# Patient Record
Sex: FEMALE | Race: OTHER | HISPANIC OR LATINO
[De-identification: names, ages, dates, MRNs, and addresses within clinical notes are randomized per-mention and may not be internally consistent; named-entity substitution may affect disease eponyms.]

---

## 2017-01-09 ENCOUNTER — TRANSCRIPTION ENCOUNTER (OUTPATIENT)
Age: 42
End: 2017-01-09

## 2017-01-13 ENCOUNTER — RECORD ABSTRACTING (OUTPATIENT)
Age: 42
End: 2017-01-13

## 2018-06-05 ENCOUNTER — TRANSCRIPTION ENCOUNTER (OUTPATIENT)
Age: 43
End: 2018-06-05

## 2018-09-29 ENCOUNTER — INPATIENT (INPATIENT)
Facility: HOSPITAL | Age: 43
LOS: 1 days | Discharge: LEFT BEFORE DISCHARGE | End: 2018-10-01
Attending: HOSPITALIST | Admitting: HOSPITALIST

## 2018-09-29 VITALS
HEIGHT: 62 IN | WEIGHT: 141.98 LBS | SYSTOLIC BLOOD PRESSURE: 118 MMHG | OXYGEN SATURATION: 97 % | HEART RATE: 111 BPM | TEMPERATURE: 102 F | RESPIRATION RATE: 18 BRPM | DIASTOLIC BLOOD PRESSURE: 60 MMHG

## 2018-09-29 DIAGNOSIS — Z88.0 ALLERGY STATUS TO PENICILLIN: ICD-10-CM

## 2018-09-29 DIAGNOSIS — R94.5 ABNORMAL RESULTS OF LIVER FUNCTION STUDIES: ICD-10-CM

## 2018-09-29 LAB
ALBUMIN SERPL ELPH-MCNC: 4.3 G/DL — SIGNIFICANT CHANGE UP (ref 3.5–5.2)
ALP SERPL-CCNC: 85 U/L — SIGNIFICANT CHANGE UP (ref 30–115)
ALT FLD-CCNC: 41 U/L — SIGNIFICANT CHANGE UP (ref 0–41)
ANION GAP SERPL CALC-SCNC: 15 MMOL/L — HIGH (ref 7–14)
ANISOCYTOSIS BLD QL: SIGNIFICANT CHANGE UP
APPEARANCE UR: ABNORMAL
AST SERPL-CCNC: 46 U/L — HIGH (ref 0–41)
BACTERIA # UR AUTO: ABNORMAL /HPF
BASE EXCESS BLDV CALC-SCNC: 1.7 MMOL/L — SIGNIFICANT CHANGE UP (ref -2–2)
BASOPHILS # BLD AUTO: 0 K/UL — SIGNIFICANT CHANGE UP (ref 0–0.2)
BASOPHILS NFR BLD AUTO: 0 % — SIGNIFICANT CHANGE UP (ref 0–1)
BILIRUB SERPL-MCNC: 0.4 MG/DL — SIGNIFICANT CHANGE UP (ref 0.2–1.2)
BILIRUB UR-MCNC: NEGATIVE — SIGNIFICANT CHANGE UP
BUN SERPL-MCNC: 8 MG/DL — LOW (ref 10–20)
CA-I SERPL-SCNC: 1.19 MMOL/L — SIGNIFICANT CHANGE UP (ref 1.12–1.3)
CALCIUM SERPL-MCNC: 9.1 MG/DL — SIGNIFICANT CHANGE UP (ref 8.5–10.1)
CHLORIDE SERPL-SCNC: 99 MMOL/L — SIGNIFICANT CHANGE UP (ref 98–110)
CO2 SERPL-SCNC: 23 MMOL/L — SIGNIFICANT CHANGE UP (ref 17–32)
COLOR SPEC: YELLOW — SIGNIFICANT CHANGE UP
CREAT SERPL-MCNC: 0.8 MG/DL — SIGNIFICANT CHANGE UP (ref 0.7–1.5)
DIFF PNL FLD: ABNORMAL
EOSINOPHIL # BLD AUTO: 0.08 K/UL — SIGNIFICANT CHANGE UP (ref 0–0.7)
EOSINOPHIL NFR BLD AUTO: 1.7 % — SIGNIFICANT CHANGE UP (ref 0–8)
GAS PNL BLDV: 137 MMOL/L — SIGNIFICANT CHANGE UP (ref 136–145)
GAS PNL BLDV: SIGNIFICANT CHANGE UP
GIANT PLATELETS BLD QL SMEAR: PRESENT — SIGNIFICANT CHANGE UP
GLUCOSE SERPL-MCNC: 109 MG/DL — HIGH (ref 70–99)
GLUCOSE UR QL: NEGATIVE MG/DL — SIGNIFICANT CHANGE UP
HCO3 BLDV-SCNC: 27 MMOL/L — SIGNIFICANT CHANGE UP (ref 22–29)
HCT VFR BLD CALC: 35.6 % — LOW (ref 37–47)
HCT VFR BLDA CALC: 37.7 % — SIGNIFICANT CHANGE UP (ref 34–44)
HGB BLD CALC-MCNC: 12.3 G/DL — LOW (ref 14–18)
HGB BLD-MCNC: 11.5 G/DL — LOW (ref 12–16)
KETONES UR-MCNC: NEGATIVE — SIGNIFICANT CHANGE UP
LACTATE BLDV-MCNC: 1.8 MMOL/L — HIGH (ref 0.5–1.6)
LACTATE SERPL-SCNC: 1.5 MMOL/L — SIGNIFICANT CHANGE UP (ref 0.5–2.2)
LEUKOCYTE ESTERASE UR-ACNC: NEGATIVE — SIGNIFICANT CHANGE UP
LYMPHOCYTES # BLD AUTO: 1.08 K/UL — LOW (ref 1.2–3.4)
LYMPHOCYTES # BLD AUTO: 22.6 % — SIGNIFICANT CHANGE UP (ref 20.5–51.1)
MANUAL SMEAR VERIFICATION: SIGNIFICANT CHANGE UP
MCHC RBC-ENTMCNC: 24.2 PG — LOW (ref 27–31)
MCHC RBC-ENTMCNC: 32.3 G/DL — SIGNIFICANT CHANGE UP (ref 32–37)
MCV RBC AUTO: 74.9 FL — LOW (ref 81–99)
MICROCYTES BLD QL: SIGNIFICANT CHANGE UP
MONOCYTES # BLD AUTO: 0.33 K/UL — SIGNIFICANT CHANGE UP (ref 0.1–0.6)
MONOCYTES NFR BLD AUTO: 7 % — SIGNIFICANT CHANGE UP (ref 1.7–9.3)
NEUTROPHILS # BLD AUTO: 3.15 K/UL — SIGNIFICANT CHANGE UP (ref 1.4–6.5)
NEUTROPHILS NFR BLD AUTO: 27.8 % — LOW (ref 42.2–75.2)
NEUTS BAND # BLD: 38.3 % — HIGH (ref 0–6)
NITRITE UR-MCNC: NEGATIVE — SIGNIFICANT CHANGE UP
NRBC # BLD: 0 /100 WBCS — SIGNIFICANT CHANGE UP (ref 0–0)
PCO2 BLDV: 45 MMHG — SIGNIFICANT CHANGE UP (ref 41–51)
PH BLDV: 7.39 — SIGNIFICANT CHANGE UP (ref 7.26–7.43)
PH UR: 7.5 — SIGNIFICANT CHANGE UP (ref 5–8)
PLAT MORPH BLD: NORMAL — SIGNIFICANT CHANGE UP
PLATELET # BLD AUTO: 257 K/UL — SIGNIFICANT CHANGE UP (ref 130–400)
PO2 BLDV: 36 MMHG — SIGNIFICANT CHANGE UP (ref 20–40)
POIKILOCYTOSIS BLD QL AUTO: SLIGHT — SIGNIFICANT CHANGE UP
POLYCHROMASIA BLD QL SMEAR: SIGNIFICANT CHANGE UP
POTASSIUM BLDV-SCNC: 3.9 MMOL/L — SIGNIFICANT CHANGE UP (ref 3.3–5.6)
POTASSIUM SERPL-MCNC: 4.4 MMOL/L — SIGNIFICANT CHANGE UP (ref 3.5–5)
POTASSIUM SERPL-SCNC: 4.4 MMOL/L — SIGNIFICANT CHANGE UP (ref 3.5–5)
PROT SERPL-MCNC: 7.2 G/DL — SIGNIFICANT CHANGE UP (ref 6–8)
PROT UR-MCNC: 30 MG/DL
RBC # BLD: 4.75 M/UL — SIGNIFICANT CHANGE UP (ref 4.2–5.4)
RBC # FLD: 20.7 % — HIGH (ref 11.5–14.5)
RBC BLD AUTO: ABNORMAL
RBC CASTS # UR COMP ASSIST: ABNORMAL /HPF
ROULEAUX BLD QL SMEAR: PRESENT — SIGNIFICANT CHANGE UP
SAO2 % BLDV: 64 % — SIGNIFICANT CHANGE UP
SODIUM SERPL-SCNC: 137 MMOL/L — SIGNIFICANT CHANGE UP (ref 135–146)
SP GR SPEC: 1.02 — SIGNIFICANT CHANGE UP (ref 1.01–1.03)
UROBILINOGEN FLD QL: 1 MG/DL (ref 0.2–0.2)
VARIANT LYMPHS # BLD: 2.6 % — SIGNIFICANT CHANGE UP (ref 0–5)
WBC # BLD: 4.77 K/UL — LOW (ref 4.8–10.8)
WBC # FLD AUTO: 4.77 K/UL — LOW (ref 4.8–10.8)

## 2018-09-29 RX ORDER — ACETAMINOPHEN 500 MG
650 TABLET ORAL ONCE
Qty: 0 | Refills: 0 | Status: COMPLETED | OUTPATIENT
Start: 2018-09-29 | End: 2018-09-29

## 2018-09-29 RX ORDER — SODIUM CHLORIDE 9 MG/ML
1000 INJECTION INTRAMUSCULAR; INTRAVENOUS; SUBCUTANEOUS ONCE
Qty: 0 | Refills: 0 | Status: COMPLETED | OUTPATIENT
Start: 2018-09-29 | End: 2018-09-29

## 2018-09-29 RX ADMIN — Medication 650 MILLIGRAM(S): at 21:55

## 2018-09-29 RX ADMIN — SODIUM CHLORIDE 1000 MILLILITER(S): 9 INJECTION INTRAMUSCULAR; INTRAVENOUS; SUBCUTANEOUS at 22:52

## 2018-09-29 RX ADMIN — SODIUM CHLORIDE 2000 MILLILITER(S): 9 INJECTION INTRAMUSCULAR; INTRAVENOUS; SUBCUTANEOUS at 23:39

## 2018-09-29 NOTE — ED PROVIDER NOTE - PROGRESS NOTE DETAILS
neg flu a and b Flu neg. Discussed admission with pt for fever/bandemia, pt agreeable with plan. Spoke with MAR, will admit pt to medicine under hospitalist. Discussed abx with MAR, recommending holding off as no known source at this time.

## 2018-09-29 NOTE — ED ADULT NURSE NOTE - OBJECTIVE STATEMENT
patient complaints of fever, sore throat and back pain.  Patient reports recent diagnosis of strep throat.  Patient denies chest pain.  Patient denies vomiting but reports nausea.  Patient reports generalized weakness and headache.

## 2018-09-29 NOTE — ED ADULT NURSE NOTE - NSIMPLEMENTINTERV_GEN_ALL_ED
Implemented All Universal Safety Interventions:  [place] to call system. Call bell, personal items and telephone within reach. Instruct patient to call for assistance. Room bathroom lighting operational. Non-slip footwear when patient is off stretcher. Physically safe environment: no spills, clutter or unnecessary equipment. Stretcher in lowest position, wheels locked, appropriate side rails in place.

## 2018-09-29 NOTE — ED PROVIDER NOTE - ATTENDING CONTRIBUTION TO CARE
44 yo f with pmh of anemia, fibroids, presents with c/o fever, malaise, sore throat, and mild cough.  had diarrhea today x 1, no vomiting, no abd pain, no urinary sx.  pt admits whole body aches.  no headache, no neck pain or stiffness, no rash.  exam: nad, ncat, perrl, eomi, mmm, erythematous OP, no swelling, no exudates, mildly tachy reg, ctab, abd soft, nt,nd aox3, no meningismus imp: pt with fever and malaise, sore throat, likely viral, will check labs, ua, cxr and labs.  found to have bandemia, will admit to r/o bacteremia

## 2018-09-29 NOTE — ED PROVIDER NOTE - OBJECTIVE STATEMENT
Pt is a 42 y/o Female, PMHX of anemia, uterine fibroids, endometriosis, presents to ED w/ fever x 2 days. Pt did not take temperature at home, waking up in sweats. Pt also endorses she feels dizzy, has sore throat, has n/d as of today, has abdominal pain and pain on urination but also has her menstrual period so is unsure if that is related. Pt reports a few weeks ago, she had sore throat also, went to urgent care where she was diagnosed with strep throat. Was started on steroids (she believes Prednisone) & abx which she does not recall name of. Also reports she started coughing today. No sick contacts. No recent travel. Denies rashes, abrasions, lacerations, ecchymosis, erythema, edema, headache, visual changes, earaches, hearing loss, nasal congestion, chest pain, palpitations, SOB, hemoptysis, vomiting, bloody stools, melena, hematuria, dysuria, myalgias, bony deformity/pain, paresthesias, tingling, weakness.

## 2018-09-29 NOTE — ED PROVIDER NOTE - NS ED ROS FT
Except as documented in HPI, all other ROS negative.   GENERAL: + fever.  SKIN: Denies rashes, abrasions, lacerations, ecchymosis, erythema, or edema.  HEAD: + dizzy.  EYES: Denies blurry vision, diplopia, or photophobia.  ENT: + sore throat.   CARDIAC: Denies chest pain, palpitations, or SOB.   RESPIRATORY: + cough.   GI: Denies abdominal pain, n/v/d.   : Denies hematuria, dysuria or frequency.   MSK: Denies myalgias, bony deformity or pain.   NEURO: Denies paresthesias, tingling, weakness.

## 2018-09-29 NOTE — ED PROVIDER NOTE - PHYSICAL EXAMINATION
VITAL SIGNS: I have reviewed the initial vital signs.   CONSTITUTIONAL: Awake, alert. Well-developed; well-nourished; in no distress. Non-toxic appearing.   SKIN: No rash, vesicles/lesion, abrasions or lacerations. No ecchymosis or signs of trauma. Warm to touch.   HEAD: Normocephalic; atraumatic.   EYES: Symmetrical, no discharge or signs of trauma. Conjunctiva and sclera clear.   ENT: Airway patent. MMM. Oropharynx with erythema. No tonsillar exudates or tonsillar enlargement. Uvula midline.   NECK: Supple; non-tender. No nuchal rigidity or meningismus.   CARD: No chest wall deformity or tenderness. S1, S2 normal; no murmurs, gallops, or rubs. + tachycardia.   RESP: Good air movement. Lungs CTAB. No crackles, wheezes, rales or rhonchi.  ABD: Soft; non-distended; non-tender.   EXT: No bony deformity or tenderness. Normal ROM x 4 extremities.

## 2018-09-30 DIAGNOSIS — Z98.890 OTHER SPECIFIED POSTPROCEDURAL STATES: Chronic | ICD-10-CM

## 2018-09-30 LAB
ALBUMIN SERPL ELPH-MCNC: 4.6 G/DL — SIGNIFICANT CHANGE UP (ref 3.5–5.2)
ALP SERPL-CCNC: 86 U/L — SIGNIFICANT CHANGE UP (ref 30–115)
ALT FLD-CCNC: 57 U/L — HIGH (ref 0–41)
ANION GAP SERPL CALC-SCNC: 15 MMOL/L — HIGH (ref 7–14)
AST SERPL-CCNC: 64 U/L — HIGH (ref 0–41)
BASOPHILS # BLD AUTO: 0.01 K/UL — SIGNIFICANT CHANGE UP (ref 0–0.2)
BASOPHILS NFR BLD AUTO: 0.2 % — SIGNIFICANT CHANGE UP (ref 0–1)
BILIRUB DIRECT SERPL-MCNC: <0.2 MG/DL — SIGNIFICANT CHANGE UP (ref 0–0.2)
BILIRUB INDIRECT FLD-MCNC: >0.3 MG/DL — SIGNIFICANT CHANGE UP (ref 0.2–1.2)
BILIRUB SERPL-MCNC: 0.5 MG/DL — SIGNIFICANT CHANGE UP (ref 0.2–1.2)
BUN SERPL-MCNC: 6 MG/DL — LOW (ref 10–20)
CALCIUM SERPL-MCNC: 9.2 MG/DL — SIGNIFICANT CHANGE UP (ref 8.5–10.1)
CHLORIDE SERPL-SCNC: 101 MMOL/L — SIGNIFICANT CHANGE UP (ref 98–110)
CO2 SERPL-SCNC: 24 MMOL/L — SIGNIFICANT CHANGE UP (ref 17–32)
CREAT SERPL-MCNC: 0.6 MG/DL — LOW (ref 0.7–1.5)
DIR ANTIGLOB POLYSPECIFIC INTERPRETATION: SIGNIFICANT CHANGE UP
EOSINOPHIL # BLD AUTO: 0 K/UL — SIGNIFICANT CHANGE UP (ref 0–0.7)
EOSINOPHIL NFR BLD AUTO: 0 % — SIGNIFICANT CHANGE UP (ref 0–8)
ERYTHROCYTE [SEDIMENTATION RATE] IN BLOOD: 33 MM/HR — HIGH (ref 0–15)
FLU A RESULT: NEGATIVE — SIGNIFICANT CHANGE UP
FLU A RESULT: NEGATIVE — SIGNIFICANT CHANGE UP
FLUAV AG NPH QL: NEGATIVE — SIGNIFICANT CHANGE UP
FLUBV AG NPH QL: NEGATIVE — SIGNIFICANT CHANGE UP
GLUCOSE SERPL-MCNC: 90 MG/DL — SIGNIFICANT CHANGE UP (ref 70–99)
HCT VFR BLD CALC: 38 % — SIGNIFICANT CHANGE UP (ref 37–47)
HGB BLD-MCNC: 11.9 G/DL — LOW (ref 12–16)
IMM GRANULOCYTES NFR BLD AUTO: 0.2 % — SIGNIFICANT CHANGE UP (ref 0.1–0.3)
IRON SATN MFR SERPL: 13 UG/DL — LOW (ref 35–150)
IRON SATN MFR SERPL: 3 % — LOW (ref 15–50)
LDH SERPL L TO P-CCNC: 339 — HIGH (ref 50–242)
LYMPHOCYTES # BLD AUTO: 1.56 K/UL — SIGNIFICANT CHANGE UP (ref 1.2–3.4)
LYMPHOCYTES # BLD AUTO: 33.7 % — SIGNIFICANT CHANGE UP (ref 20.5–51.1)
MAGNESIUM SERPL-MCNC: 2.2 MG/DL — SIGNIFICANT CHANGE UP (ref 1.8–2.4)
MCHC RBC-ENTMCNC: 23.8 PG — LOW (ref 27–31)
MCHC RBC-ENTMCNC: 31.3 G/DL — LOW (ref 32–37)
MCV RBC AUTO: 76 FL — LOW (ref 81–99)
MONOCYTES # BLD AUTO: 0.23 K/UL — SIGNIFICANT CHANGE UP (ref 0.1–0.6)
MONOCYTES NFR BLD AUTO: 5 % — SIGNIFICANT CHANGE UP (ref 1.7–9.3)
NEUTROPHILS # BLD AUTO: 2.82 K/UL — SIGNIFICANT CHANGE UP (ref 1.4–6.5)
NEUTROPHILS NFR BLD AUTO: 60.9 % — SIGNIFICANT CHANGE UP (ref 42.2–75.2)
PHOSPHATE SERPL-MCNC: 3 MG/DL — SIGNIFICANT CHANGE UP (ref 2.1–4.9)
PLATELET # BLD AUTO: 245 K/UL — SIGNIFICANT CHANGE UP (ref 130–400)
POTASSIUM SERPL-MCNC: 4.1 MMOL/L — SIGNIFICANT CHANGE UP (ref 3.5–5)
POTASSIUM SERPL-SCNC: 4.1 MMOL/L — SIGNIFICANT CHANGE UP (ref 3.5–5)
PROT SERPL-MCNC: 7.5 G/DL — SIGNIFICANT CHANGE UP (ref 6–8)
RBC # BLD: 5 M/UL — SIGNIFICANT CHANGE UP (ref 4.2–5.4)
RBC # BLD: 5 M/UL — SIGNIFICANT CHANGE UP (ref 4.2–5.4)
RBC # FLD: 21.4 % — HIGH (ref 11.5–14.5)
RETICS #: 51 K/UL — SIGNIFICANT CHANGE UP (ref 25–125)
RETICS/RBC NFR: 1 % — SIGNIFICANT CHANGE UP (ref 0.5–1.5)
SODIUM SERPL-SCNC: 140 MMOL/L — SIGNIFICANT CHANGE UP (ref 135–146)
TIBC SERPL-MCNC: 401 UG/DL — SIGNIFICANT CHANGE UP (ref 220–430)
TRANSFERRIN SERPL-MCNC: 324 MG/DL — SIGNIFICANT CHANGE UP (ref 200–360)
UIBC SERPL-MCNC: 388 UG/DL — HIGH (ref 110–370)
WBC # BLD: 4.63 K/UL — LOW (ref 4.8–10.8)
WBC # FLD AUTO: 4.63 K/UL — LOW (ref 4.8–10.8)

## 2018-09-30 RX ORDER — IBUPROFEN 200 MG
400 TABLET ORAL EVERY 6 HOURS
Qty: 0 | Refills: 0 | Status: DISCONTINUED | OUTPATIENT
Start: 2018-09-30 | End: 2018-10-01

## 2018-09-30 RX ORDER — ENOXAPARIN SODIUM 100 MG/ML
40 INJECTION SUBCUTANEOUS EVERY 24 HOURS
Qty: 0 | Refills: 0 | Status: DISCONTINUED | OUTPATIENT
Start: 2018-09-30 | End: 2018-10-01

## 2018-09-30 RX ORDER — KETOROLAC TROMETHAMINE 30 MG/ML
15 SYRINGE (ML) INJECTION ONCE
Qty: 0 | Refills: 0 | Status: DISCONTINUED | OUTPATIENT
Start: 2018-09-30 | End: 2018-09-30

## 2018-09-30 RX ORDER — SODIUM CHLORIDE 9 MG/ML
1000 INJECTION, SOLUTION INTRAVENOUS
Qty: 0 | Refills: 0 | Status: DISCONTINUED | OUTPATIENT
Start: 2018-09-30 | End: 2018-10-01

## 2018-09-30 RX ORDER — CHLORHEXIDINE GLUCONATE 213 G/1000ML
1 SOLUTION TOPICAL
Qty: 0 | Refills: 0 | Status: DISCONTINUED | OUTPATIENT
Start: 2018-09-30 | End: 2018-10-01

## 2018-09-30 RX ADMIN — Medication 400 MILLIGRAM(S): at 06:56

## 2018-09-30 RX ADMIN — ENOXAPARIN SODIUM 40 MILLIGRAM(S): 100 INJECTION SUBCUTANEOUS at 21:15

## 2018-09-30 RX ADMIN — SODIUM CHLORIDE 100 MILLILITER(S): 9 INJECTION, SOLUTION INTRAVENOUS at 21:16

## 2018-09-30 RX ADMIN — SODIUM CHLORIDE 100 MILLILITER(S): 9 INJECTION, SOLUTION INTRAVENOUS at 05:21

## 2018-09-30 RX ADMIN — Medication 650 MILLIGRAM(S): at 08:27

## 2018-09-30 RX ADMIN — Medication 400 MILLIGRAM(S): at 15:34

## 2018-09-30 RX ADMIN — Medication 15 MILLIGRAM(S): at 08:27

## 2018-09-30 RX ADMIN — Medication 15 MILLIGRAM(S): at 00:23

## 2018-09-30 RX ADMIN — SODIUM CHLORIDE 100 MILLILITER(S): 9 INJECTION, SOLUTION INTRAVENOUS at 15:34

## 2018-09-30 RX ADMIN — Medication 400 MILLIGRAM(S): at 08:27

## 2018-09-30 NOTE — H&P ADULT - NSHPREVIEWOFSYSTEMS_GEN_ALL_CORE
REVIEW OF SYSTEMS:    CONSTITUTIONAL: see hpi  EYES/ENT: No visual changes  NECK: neck pain (chronic)  RESPIRATORY: intermittent cough  CARDIOVASCULAR: No chest pain or palpitations, no lower extremity edema  GASTROINTESTINAL: 1 episode diarrhea  GENITOURINARY: No pain with urination, no increased urinary frequency, no dark o r bloody urine  NEUROLOGICAL: No numbness or weakness  SKIN: No itching, rashes

## 2018-09-30 NOTE — H&P ADULT - HISTORY OF PRESENT ILLNESS
43/F hx of anemia, unterine fibroids s/p embolization, presents with subjective fever.    Pt states Wednesday she was not feeling well. Then Thursday and friday she was having fevers w/ chills & sweating. See did not check her temperature. She took tylenol, which helped with fever, but fever would come back again. She had 1 episode of diarrhea today - she didnt look at her stool though, but it was watery. No urinary symptoms. No SOB. No rash. No swollen joints. She does report muscle aches. No chest pain. Has intermittent cough, no coughing now. Has left trapezius pain and left sided neck pain - which is chronic due to herniated disc.    Recently treated for strep throat (diagnosed on examination only, never was swabbed) - completed 10 days antibiotics 1 wk ago. No longer pain with swallowing.

## 2018-09-30 NOTE — H&P ADULT - ASSESSMENT
DVT ppx  CHG bath daily & PRN  Regular diet # Subjective fevers  - No tachy, no hypotension, no lactic acidosis  - No CXR findings or findings on physical exam s/o pulmonary infection  - Abdomen is non-tender   - F/u bld cx  - Repeat CBC (had 40% bands)  - Check TSH    # Anemia  - pt on menstrual cycle now  - check Fe studies    DVT ppx  CHG bath daily & PRN  Regular diet 43/F hx of anemia, unterine fibroids s/p embolization, presents with subjective fever, 1 episode diarrhea, recent tx strep pharyngitis. While in ED pt had fever 101.8, bandemia (40%).     # Fever - no source  - No tachy, no hypotension, no lactic acidosis  - No CXR findings or findings on physical exam s/o pulmonary infection  - Abdomen is non-tender   - F/u bld cx  - Repeat CBC (had 40% bands)  - Check TSH    # Anemia  - pt on menstrual cycle now  - check Fe studies    DVT ppx  CHG bath daily & PRN  Regular diet 43/F hx of anemia, unterine fibroids s/p embolization, presents with subjective fever, 1 episode diarrhea, recent tx strep pharyngitis. While in ED pt had fever 101.8, bandemia (40%), and leukopenia.     # Fever with bandemia & leukopenia - likely viral etiology  - No tachy, no hypotension, no lactic acidosis  - No CXR findings or findings on physical exam s/o pulmonary infection  - Swabbed by ED - neg for flu  - Abdomen is non-tender   - F/u bld cx  - Repeat CBC (had 40% bands)  - Check TSH  - Check HIV - pt states never had sexual relations    # Anemia  - pt on menstrual cycle now  - check Fe studies  - Check hemolytic w/u  DVT ppx  CHG bath daily & PRN  Regular diet 43/F hx of anemia, unterine fibroids s/p embolization, presents with subjective fever, 1 episode diarrhea, recent tx strep pharyngitis. While in ED pt had fever 101.8, bandemia (40%), and leukopenia.     # Fever with bandemia & leukopenia - likely viral etiology  - No tachy, no hypotension, no lactic acidosis, no wt loss, no night sweats, no sick contacts  - No CXR findings or findings on physical exam s/o pulmonary infection  - Swabbed by ED - neg for flu  - UA neg, has blood - pt is on menstrual cycle, no leuks or nitrates, no urinary symptoms  - Abdomen is non-tender   - Rouleaux formaiton on CBC - likely related to infection, will check ESR & CRP, and Ur preg. no renal dysfucntion, hypercalcemia, no neurologic symptoms  - No pharyngeal lesions or erythema  - F/u bld cx  - Repeat CBC (had 40% bands)  - Check TSH  - Check HIV - pt states never had sexual relations  - IV hydration  - Motrin PRN for fever    # Anemia  - pt on menstrual cycle now  - check Fe studies, b12, folate  - Check hemolytic w/u    DVT ppx  CHG bath daily & PRN  Regular diet  Ambulate as tolerated  Full Code 43/F hx of anemia, unterine fibroids s/p embolization, presents with subjective fever, 1 episode diarrhea, recent tx strep pharyngitis. While in ED pt had fever 101.8, bandemia (40%), and leukopenia.     # Fever with bandemia & leukopenia - likely viral etiology  - No tachy, no hypotension, no lactic acidosis, no wt loss, no night sweats, no sick contacts  - No CXR findings or findings on physical exam s/o pulmonary infection  - Swabbed by ED - neg for flu  - UA neg, has blood - pt is on menstrual cycle, no leuks or nitrates, no urinary symptoms  - Abdomen is non-tender   - Rouleaux formaiton on CBC - likely related to infection, will check ESR & CRP, and Ur preg. no renal dysfucntion, hypercalcemia, no neurologic symptoms  - No pharyngeal lesions or erythema  - F/u bld cx  - Repeat CBC (had 40% bands)  - Check TSH  - Check HIV - pt states never had sexual relations  - IV hydration  - Motrin PRN for fever    # Anemia - microcytic with increased RDW - likely Iron def. anemia  - pt on menstrual cycle now  - check Fe studies, b12, folate  - Check hemolytic w/u    DVT ppx  CHG bath daily & PRN  Regular diet  Ambulate as tolerated  Full Code 43/F hx of anemia, unterine fibroids s/p embolization, presents with subjective fever, 1 episode diarrhea, recent tx strep pharyngitis. While in ED pt had fever 101.8, bandemia (40%), and leukopenia.     # Fever with bandemia & leukopenia - likely viral etiology  - No tachy, no hypotension, no lactic acidosis  - No CXR findings or findings on physical exam s/o pulmonary infection  - Swabbed by ED - neg for flu  - UA neg, has blood - pt is on menstrual cycle, no leuks or nitrates, no urinary symptoms  - Abdomen is non-tender   - Rouleaux formaiton on CBC - likely related to infection, will check ESR & CRP, and Ur preg. no renal dysfucntion, hypercalcemia, no neurologic symptoms  - No pharyngeal lesions or erythema  - F/u bld cx  - Repeat CBC (had 40% bands)  - Check TSH  - Check HIV - pt states never had sexual relations  - IV hydration  - Motrin PRN for fever    # Anemia - microcytic with increased RDW - likely Iron def. anemia  - pt on menstrual cycle now  - check Fe studies, b12, folate  - Check hemolytic w/u    DVT ppx  CHG bath daily & PRN  Regular diet  Ambulate as tolerated  Full Code 43/F hx of anemia, unterine fibroids s/p embolization, presents with subjective fever, 1 episode diarrhea, recent tx strep pharyngitis (abx unknown name, and steroids). While in ED pt had fever 101.8, bandemia (40%), and leukopenia.     # Fever with bandemia & leukopenia - likely viral etiology  - No tachy, no hypotension, no lactic acidosis  - No CXR findings or findings on physical exam s/o pulmonary infection  - Swabbed by ED - neg for flu  - UA neg, has blood - pt is on menstrual cycle, no leuks or nitrates, no urinary symptoms  - Abdomen is non-tender   - Rouleaux formaiton on CBC - likely related to infection, will check ESR & CRP, and Ur preg. no renal dysfucntion, hypercalcemia, no neurologic symptoms  - No pharyngeal lesions or erythema  - F/u bld cx  - Repeat CBC (had 40% bands)  - Check TSH  - Check HIV - pt states never had sexual relations  - IV hydration  - Motrin PRN for fever    # Anemia - microcytic with increased RDW - likely Iron def. anemia  - pt on menstrual cycle now  - check Fe studies, b12, folate  - Check hemolytic w/u    DVT ppx  CHG bath daily & PRN  Regular diet  Ambulate as tolerated  Full Code 43/F hx of anemia, unterine fibroids s/p embolization, presents with subjective fever, 1 episode diarrhea, recent tx strep pharyngitis (abx unknown name, and steroids). While in ED pt had fever 101.8, bandemia (40%), and leukopenia.     # Fever with bandemia & leukopenia - likely viral etiology  - No tachy, no hypotension, no lactic acidosis  - No CXR findings or findings on physical exam s/o pulmonary infection  - Swabbed by ED - neg for flu  - UA neg, has blood - pt is on menstrual cycle, no leuks or nitrates, no urinary symptoms  - Abdomen is non-tender   - Rouleaux formaiton on CBC - likely related to infection, will check ESR & CRP, and Ur preg. no renal dysfucntion, hypercalcemia, no neurologic symptoms  - No pharyngeal lesions or erythema  - F/u bld cx  - Repeat CBC (had 40% bands)  - Check TSH  - Check HIV - pt states never had sexual relations  - IV hydration  - Motrin PRN for fever    # Anemia - microcytic with increased RDW - likely Iron def. anemia  - pt on menstrual cycle now  - check Fe studies, b12, folate  - Check retic count  - Check hemolytic w/u    DVT ppx  CHG bath daily & PRN  Regular diet  Ambulate as tolerated  Full Code

## 2018-09-30 NOTE — H&P ADULT - FAMILY HISTORY
Family history of diabetes mellitus (DM), mom     Father  Still living? No  Family history of diabetes mellitus, Age at diagnosis: Age Unknown  Family history of bladder cancer, Age at diagnosis: Age Unknown     Sibling  Still living? Yes, Estimated age: 31-40  Family history of diabetes mellitus, Age at diagnosis: Age Unknown

## 2018-09-30 NOTE — H&P ADULT - NSHPPHYSICALEXAM_GEN_ALL_CORE
Vital Signs Last 24 Hrs  T(C): 37.1 (30 Sep 2018 00:46), Max: 38.8 (29 Sep 2018 21:47)  T(F): 98.7 (30 Sep 2018 00:46), Max: 101.8 (29 Sep 2018 21:47)  HR: 83 (30 Sep 2018 00:46) (77 - 111)  BP: 93/50 (30 Sep 2018 00:46) (93/50 - 118/60)  RR: 18 (30 Sep 2018 00:46) (18 - 18)  SpO2: 98% (30 Sep 2018 00:46) (97% - 98%)    PHYSICAL EXAM:  GENERAL: NAD, speaks in full sentences, no signs of respiratory distress  HEAD:  Atraumatic, Normocephalic  EYES: EOMI, conjunctiva and sclera clear, no nystagus  Throat - no erythema, no vescicles, no lesions  NECK: Supple, No JVD, ability to flex neck   CHEST/LUNG: Clear to auscultation bilaterally; No wheeze; No crackles; No accessory muscles used  HEART: Regular rate and rhythm; No murmurs;   ABDOMEN: Soft, Nontender, Nondistended; Bowel sounds present; No guarding; non-rigid  EXTREMITIES:  no Edema  PSYCH: AAOx3  NEUROLOGY: non-focal  SKIN: No rashes or lesions

## 2018-09-30 NOTE — H&P ADULT - ATTENDING COMMENTS
Fever on and off, Leukopenia with bandemia.   Follow up repeat cbc.   Not feeling well yet.   Encourage ample amount of drinking water and or fluid.     D/C planning tomorrow once the patient clinically stable and CBC within expectable range.

## 2018-09-30 NOTE — H&P ADULT - NSHPLABSRESULTS_GEN_ALL_CORE
11.5   4.77  )-----------( 257      ( 29 Sep 2018 22:30 )             35.6           137  |  99  |  8<L>  ----------------------------<  109<H>  4.4   |  23  |  0.8    Ca    9.1      29 Sep 2018 22:30    TPro  7.2  /  Alb  4.3  /  TBili  0.4  /  DBili  x   /  AST  46<H>  /  ALT  41  /  AlkPhos  85          Urinalysis Basic - ( 29 Sep 2018 22:30 )    Color: Yellow / Appearance: Cloudy / S.025 / pH: x  Gluc: x / Ketone: Negative  / Bili: Negative / Urobili: 1.0 mg/dL   Blood: x / Protein: 30 mg/dL / Nitrite: Negative   Leuk Esterase: Negative / RBC: 10-25 /HPF / WBC x   Sq Epi: x / Non Sq Epi: x / Bacteria: Few /HPF    Lactate Trend   @ 22:30 Lactate:1.5     CAPILLARY BLOOD GLUCOSE

## 2018-10-01 VITALS
TEMPERATURE: 99 F | SYSTOLIC BLOOD PRESSURE: 123 MMHG | DIASTOLIC BLOOD PRESSURE: 60 MMHG | RESPIRATION RATE: 18 BRPM | OXYGEN SATURATION: 100 % | HEART RATE: 98 BPM

## 2018-10-01 LAB
CULTURE RESULTS: SIGNIFICANT CHANGE UP
FERRITIN SERPL-MCNC: 76 NG/ML — SIGNIFICANT CHANGE UP (ref 15–150)
FOLATE SERPL-MCNC: 19.6 NG/ML — SIGNIFICANT CHANGE UP
HIV 1+2 AB+HIV1 P24 AG SERPL QL IA: SIGNIFICANT CHANGE UP
SPECIMEN SOURCE: SIGNIFICANT CHANGE UP
TSH SERPL-MCNC: 1.09 UIU/ML — SIGNIFICANT CHANGE UP (ref 0.27–4.2)
VIT B12 SERPL-MCNC: 671 PG/ML — SIGNIFICANT CHANGE UP (ref 232–1245)

## 2018-10-01 RX ORDER — FERROUS SULFATE 325(65) MG
325 TABLET ORAL
Qty: 0 | Refills: 0 | Status: DISCONTINUED | OUTPATIENT
Start: 2018-10-01 | End: 2018-10-01

## 2018-10-01 RX ORDER — TRAMADOL HYDROCHLORIDE 50 MG/1
25 TABLET ORAL ONCE
Qty: 0 | Refills: 0 | Status: DISCONTINUED | OUTPATIENT
Start: 2018-10-01 | End: 2018-10-01

## 2018-10-01 RX ORDER — ACETAMINOPHEN 500 MG
650 TABLET ORAL EVERY 6 HOURS
Qty: 0 | Refills: 0 | Status: DISCONTINUED | OUTPATIENT
Start: 2018-10-01 | End: 2018-10-01

## 2018-10-01 RX ADMIN — Medication 650 MILLIGRAM(S): at 10:26

## 2018-10-01 RX ADMIN — Medication 400 MILLIGRAM(S): at 08:28

## 2018-10-01 RX ADMIN — TRAMADOL HYDROCHLORIDE 25 MILLIGRAM(S): 50 TABLET ORAL at 05:26

## 2018-10-01 RX ADMIN — Medication 400 MILLIGRAM(S): at 08:10

## 2018-10-01 RX ADMIN — Medication 325 MILLIGRAM(S): at 11:16

## 2018-10-01 RX ADMIN — SODIUM CHLORIDE 100 MILLILITER(S): 9 INJECTION, SOLUTION INTRAVENOUS at 15:13

## 2018-10-01 RX ADMIN — Medication 400 MILLIGRAM(S): at 01:15

## 2018-10-01 RX ADMIN — Medication 650 MILLIGRAM(S): at 09:04

## 2018-10-01 RX ADMIN — SODIUM CHLORIDE 100 MILLILITER(S): 9 INJECTION, SOLUTION INTRAVENOUS at 05:12

## 2018-10-01 NOTE — PROGRESS NOTE ADULT - ASSESSMENT
43/F hx of anemia, unterine fibroids s/p embolization, presents with subjective fever, 1 episode diarrhea, recent tx strep pharyngitis (abx unknown name, and steroids). While in ED pt had fever 101.8, bandemia (40%), and leukopenia.     # Fever with bandemia & leukopenia - likely viral etiology  - No tachy, no hypotension, no lactic acidosis  - No CXR findings or findings on physical exam s/o pulmonary infection  - Swabbed by ED - neg for flu  - UA neg, has blood - pt is on menstrual cycle, no leuks or nitrates, no urinary symptoms  - Abdomen is non-tender   - Rouleaux formaiton on CBC - likely related to infection, will check ESR & CRP, and Ur preg. no renal dysfucntion, hypercalcemia, no neurologic symptoms  - No pharyngeal lesions or erythema  - F/u final bld cx, prelim negative  -IV fluids for now  - f/u with ID for evaluation of FUO. f/u FUO workup  -   - IV hydration  - Motrin PRN for fever    # Anemia - microcytic with increased RDW - likely Iron def. anemia  - pt on menstrual cycle now  - check Fe studies, b12, folate  - Check retic count  - Check hemolytic w/u    DVT ppx  CHG bath daily & PRN  Regular diet  Ambulate as tolerated  Full Code

## 2018-10-01 NOTE — PROGRESS NOTE ADULT - SUBJECTIVE AND OBJECTIVE BOX
HOSPITALIST ATTENDING NOTE    CAROLYNN TREVIÑO  43y Female  [id number]    INTERVAL HPI/OVERNIGHT EVENTS: feels ok - +persistent fever  no diarrhea, no abd pain, no uri sx, no meningeal sxs, no cough, no diarrhea, abd pain resolved, no dysuria    T(C): 37.1 (10-01-18 @ 10:26), Max: 38.7 (10-01-18 @ 08:58)  HR: 99 (10-01-18 @ 07:45) (75 - 112)  BP: 109/58 (10-01-18 @ 07:45) (89/57 - 128/69)  RR: 18 (10-01-18 @ 07:45) (18 - 19)  SpO2: 99% (10-01-18 @ 07:45) (99% - 100%)  Wt(kg): --      PHYSICAL EXAM:  GENERAL: NAD  HEAD:  Atraumatic, Normocephalic  EYES: EOMI, PERRLA, conjunctiva and sclera clear  NECK: Supple, No JVD, Normal thyroid  NERVOUS SYSTEM:  Alert & Oriented X3, Good concentration; Non-focal- Motor Strength 5/5 B/L upper and lower extremities;  CHEST/LUNG: Clear to ascultation bilaterally; No rales, rhonchi, wheezing,   HEART: Regular rate and rhythm; No murmurs, rubs, or gallops  ABDOMEN: Soft, Nontender, Nondistended; Bowel sounds present  EXTREMITIES: No clubbing, cyanosis, or edema  SKIN: No rashes or lesions      Consultant(s) Notes Reviewed:  [x ] YES  [ ] NO  Care Discussed with Consultants/Other Providers/ Housestaff [ x] YES  [ ] NO    LABS:                        11.9   4.63  )-----------( 245      ( 30 Sep 2018 13:46 )             38.0     09-30    140  |  101  |  6<L>  ----------------------------<  90  4.1   |  24  |  0.6<L>    Ca    9.2      30 Sep 2018 13:46  Phos  3.0     09-30  Mg     2.2     09-30    TPro  7.5  /  Alb  4.6  /  TBili  0.5  /  DBili  <0.2  /  AST  64<H>  /  ALT  57<H>  /  AlkPhos  86  09-30      Culture - Blood (collected 29 Sep 2018 23:45)  Source: .Blood Blood  Preliminary Report (01 Oct 2018 07:01):    No growth to date.    Culture - Blood (collected 29 Sep 2018 22:40)  Source: .Blood Blood  Preliminary Report (01 Oct 2018 07:01):    No growth to date.    Culture - Urine (collected 29 Sep 2018 22:30)  Source: .Urine Clean Catch (Midstream)  Final Report (01 Oct 2018 11:16):    Culture grew 3 or more types of organisms which indicate    collection contamination; consider recollection only if clinically    indicated.          RADIOLOGY & ADDITIONAL TESTS:    Imaging or report Personally Reviewed:  [ ] YES  [ ] NO    Case discussed with resident    Care discussed with pt/family      HEALTH ISSUES - PROBLEM Dx:

## 2018-10-01 NOTE — DISCHARGE NOTE ADULT - HOSPITAL COURSE
43/F hx of anemia, unterine fibroids s/p embolization, presents with subjective fever.Recently treated for strep throat (diagnosed on examination only, never was swabbed) - completed 10 days antibiotics 1 wk ago. Pt admitted for Fever with bandemia.  Called by RN, as patient wishes to leave AMA. Patient seen at bedside to further discuss plan of care.  Pt is alert and oriented x 3 and competent to make medical decisions.  Discussed risks of leaving against medical advice, which includes worsening of current medical condition, up to and including death.  Patient understands risks and still wishes to leave.  AMA paperwork signed and placed in chart.  Dr. Barrett made aware. 43/F hx of anemia, unterine fibroids s/p embolization, presents with subjective fever.Recently treated for strep throat (diagnosed on examination only, never was swabbed) - completed 10 days antibiotics 1 wk ago. Pt admitted for Fever with bandemia.  Called by RN, as patient wishes to leave AMA. Patient seen at bedside to further discuss plan of care.  Pt is alert and oriented x 3 and competent to make medical decisions.  Discussed risks of leaving against medical advice, which includes worsening of current medical condition, up to and including death.  Patient understands risks and still wishes to leave.  AMA paperwork signed and placed in chart.   Will discuss with attending on call.

## 2018-10-01 NOTE — CONSULT NOTE ADULT - ASSESSMENT
IMPRESSION:  On admission fevers, nl WBC with significant bandemia which is suggestive of a bacterial process.  Blood cultures are negative off antibiotics.  Possible had viral tonsillitis which was thought to be Strep throat 2 weeks ago ( pt never had a throat culture ) which now has caused viremia with mild transaminitis.  See no bacterial infection.    RECOMMENDATIONS:  No antibiotics.  EBV/ CMV serology.  monospot test.  ECHO

## 2018-10-01 NOTE — PROGRESS NOTE ADULT - SUBJECTIVE AND OBJECTIVE BOX
Patient is a 43y old  Female who presents with a chief complaint of subjective fever, bandemia (01 Oct 2018 12:12)        Over Night Events: Patient still has fever regularly despite giving motrin. Will alternate motrin and tylenol. She denies cough, dysuria, chills, diarrhea.        ROS:  See HPI    PHYSICAL EXAM    ICU Vital Signs Last 24 Hrs  T(C): 37.1 (01 Oct 2018 10:26), Max: 38.7 (01 Oct 2018 08:58)  T(F): 98.8 (01 Oct 2018 10:26), Max: 101.7 (01 Oct 2018 08:58)  HR: 99 (01 Oct 2018 07:45) (75 - 112)  BP: 109/58 (01 Oct 2018 07:45) (89/57 - 128/69)  BP(mean): --  ABP: --  ABP(mean): --  RR: 18 (01 Oct 2018 07:45) (18 - 19)  SpO2: 99% (01 Oct 2018 07:45) (99% - 100%)      General: NAD  lying comfortably in bd  HEENT: MARYANN             Lymphatic system: No cervical LN   Lungs: Bilateral BS  Cardiovascular: Regular s1s2 no murmur  Gastrointestinal: Soft, Positive BS  Extremities: No clubbing.  Moves extremities.  Full Range of motion   Skin: Warm, intact  Neurological: AAO4 No motor or sensory deficit         LABS:                            11.9   4.63  )-----------( 245      ( 30 Sep 2018 13:46 )             38.0                                               09-30    140  |  101  |  6<L>  ----------------------------<  90  4.1   |  24  |  0.6<L>    Ca    9.2      30 Sep 2018 13:46  Phos  3.0     09-30  Mg     2.2     -30    TPro  7.5  /  Alb  4.6  /  TBili  0.5  /  DBili  <0.2  /  AST  64<H>  /  ALT  57<H>  /  AlkPhos  86  -30                                             Urinalysis Basic - ( 29 Sep 2018 22:30 )    Color: Yellow / Appearance: Cloudy / S.025 / pH: x  Gluc: x / Ketone: Negative  / Bili: Negative / Urobili: 1.0 mg/dL   Blood: x / Protein: 30 mg/dL / Nitrite: Negative   Leuk Esterase: Negative / RBC: 10-25 /HPF / WBC x   Sq Epi: x / Non Sq Epi: x / Bacteria: Few /HPF                                                  LIVER FUNCTIONS - ( 30 Sep 2018 13:46 )  Alb: 4.6 g/dL / Pro: 7.5 g/dL / ALK PHOS: 86 U/L / ALT: 57 U/L / AST: 64 U/L / GGT: x                                                  Culture - Blood (collected 29 Sep 2018 23:45)  Source: .Blood Blood  Preliminary Report (01 Oct 2018 07:01):    No growth to date.    Culture - Blood (collected 29 Sep 2018 22:40)  Source: .Blood Blood  Preliminary Report (01 Oct 2018 07:01):    No growth to date.    Culture - Urine (collected 29 Sep 2018 22:30)  Source: .Urine Clean Catch (Midstream)  Final Report (01 Oct 2018 11:16):    Culture grew 3 or more types of organisms which indicate    collection contamination; consider recollection only if clinically    indicated.                                                                                           MEDICATIONS  (STANDING):  chlorhexidine 4% Liquid 1 Application(s) Topical <User Schedule>  enoxaparin Injectable 40 milliGRAM(s) SubCutaneous every 24 hours  ferrous    sulfate 325 milliGRAM(s) Oral two times a day  lactated ringers. 1000 milliLiter(s) (100 mL/Hr) IV Continuous <Continuous>    MEDICATIONS  (PRN):  acetaminophen   Tablet .. 650 milliGRAM(s) Oral every 6 hours PRN Temp greater or equal to 38C (100.4F)  ibuprofen  Tablet. 400 milliGRAM(s) Oral every 6 hours PRN Temp greater or equal to 38C (100.4F)      Xrays:                                                                                     ECHO

## 2018-10-01 NOTE — PROGRESS NOTE ADULT - ASSESSMENT
43/F hx of anemia, unterine fibroids s/p embolization, presents with subjective fever.Recently treated for strep throat (diagnosed on examination only, never was swabbed) - completed 10 days antibiotics 1 wk ago. No longer pain with swallowing    .# Fever with bandemia & leukopenia - likely viral etiology    - No tachy, no hypotension, no lactic acidosis  - No CXR findings or findings on physical exam s/o pulmonary infection  - Swabbed by ED - neg for flu  - UA neg, has blood - pt is on menstrual cycle, no leuks or nitrates, no urinary symptoms  - Abdomen is non-tender   -- No pharyngeal lesions or erythema  -  blood culture prelim negative  - Repeat CBC (had 40% bands)  - Check TSH  - Check HIV - pt states never had sexual relations  - IV hydration  - Motrin PRN for fever  - ID eval for persistent fever - on /off for past few weeks  -viral etiology?vs hematologic    # Anemia - microcytic with increased RDW - likely Iron def. anemia  - pt on menstrual cycle now  - check Fe studies, b12, folate  - Check retic count  - Check hemolytic w/u    #elevation in lft's since admission - check ruq sono - nontender

## 2018-10-01 NOTE — CHART NOTE - NSCHARTNOTEFT_GEN_A_CORE
Called by RN multiple times that patient is eloping.    Spoke with patient who is tearful and frustrated for being in ED and not being moved upstairs to a medical floor.  Called bed management and she does not have a bed as of now.   Discussed plan of care with patient.  She understands the risks of leaving the hospital against medical advice.  It was explained to her that she had fevers with elevated immature neutrophils which may be a marker for infection and we are still waiting for cultures to determine appropriate course of treatment.  Pt understands risks and would like to sign AMA. AMA form signed and pt is safely discharged against medical advice.   Pt's vitals stable currently.  Pt was febrile this morning at 8 AM.          Unable to get in touch with attending due to change of shift. Will try again.

## 2018-10-01 NOTE — PROVIDER CONTACT NOTE (OTHER) - ACTION/TREATMENT ORDERED:
As per MD she will come down when she can but she is busy with other emergencies' now.
As per MD Savanna MD will order tylenol

## 2018-10-01 NOTE — DISCHARGE NOTE ADULT - PLAN OF CARE
Resolution You were admitted for an infection.  We do not know the source of the infection.  But you had elevated immature neutrophils (also known as bandemia).  We are waiting on some of the blood work to come back.  Please follow up with a doctor/ hospital as you are leaving against medical advice.

## 2018-10-01 NOTE — DISCHARGE NOTE ADULT - CARE PLAN
Principal Discharge DX:	Fever  Goal:	Resolution  Assessment and plan of treatment:	You were admitted for an infection.  We do not know the source of the infection.  But you had elevated immature neutrophils (also known as bandemia).  We are waiting on some of the blood work to come back.  Please follow up with a doctor/ hospital as you are leaving against medical advice.

## 2018-10-01 NOTE — DISCHARGE NOTE ADULT - PATIENT PORTAL LINK FT
You can access the [facility] Patient Portal, offered by [facility], by registering with the following website: [email or web address]

## 2018-10-01 NOTE — PROVIDER CONTACT NOTE (OTHER) - ASSESSMENT
Pt assessed, pt making a scene wanting to leave. Pt will not state why states she has no reason to tell anyone here why she wants to leave. Pt states she will take out her own IV on her own.

## 2018-10-01 NOTE — CONSULT NOTE ADULT - SUBJECTIVE AND OBJECTIVE BOX
CAROLYNN TREVIÑO  43y, Female  Allergy: penicillin (Short breath)      HPI:  43/F hx of anemia, uterine fibroids s/p embolization, presents with subjective fever.    Pt states Wednesday she was not feeling well. Then Thursday and friday she was having fevers w/ chills & sweating. See did not check her temperature. She took tylenol, which helped with fever, but fever would come back again. She had 1 episode of diarrhea today - she didnt look at her stool though, but it was watery. No urinary symptoms. No SOB. No rash. No swollen joints. She does report muscle aches. No chest pain. Has intermittent cough, no coughing now. Has left trapezius pain and left sided neck pain - which is chronic due to herniated disc.    Recently treated for strep throat (diagnosed on examination only, never was swabbed) - completed 10 days antibiotics 1 wk ago. No longer pain with swallowing. (30 Sep 2018 04:05)    No recent travels    FAMILY HISTORY:  Family history of diabetes mellitus (DM): mom  Family history of bladder cancer (Father): dad  age 69  Family history of diabetes mellitus (Sibling): brother 38  Family history of diabetes mellitus (Father): dad  69    PAST MEDICAL & SURGICAL HISTORY:  Uterine fibroid  History of hand surgery: left hand, due to trauam        VITALS:  T(F): 98.8, Max: 101.7 (10-01-18 @ 08:58)  HR: 99  BP: 109/58  RR: 18Vital Signs Last 24 Hrs  T(C): 37.1 (01 Oct 2018 10:26), Max: 38.7 (01 Oct 2018 08:58)  T(F): 98.8 (01 Oct 2018 10:26), Max: 101.7 (01 Oct 2018 08:58)  HR: 99 (01 Oct 2018 07:45) (75 - 112)  BP: 109/58 (01 Oct 2018 07:45) (89/57 - 128/69)  BP(mean): --  RR: 18 (01 Oct 2018 07:45) (18 - 19)  SpO2: 99% (01 Oct 2018 07:45) (99% - 100%)    TESTS & MEASUREMENTS:                        11.9   4.63  )-----------( 245      ( 30 Sep 2018 13:46 )             38.0     09-30    140  |  101  |  6<L>  ----------------------------<  90  4.1   |  24  |  0.6<L>    Ca    9.2      30 Sep 2018 13:46  Phos  3.0       Mg     2.2         TPro  7.5  /  Alb  4.6  /  TBili  0.5  /  DBili  <0.2  /  AST  64<H>  /  ALT  57<H>  /  AlkPhos  86      LIVER FUNCTIONS - ( 30 Sep 2018 13:46 )  Alb: 4.6 g/dL / Pro: 7.5 g/dL / ALK PHOS: 86 U/L / ALT: 57 U/L / AST: 64 U/L / GGT: x             Culture - Blood (collected 18 @ 23:45)  Source: .Blood Blood  Preliminary Report (10-01-18 @ 07:01):    No growth to date.    Culture - Blood (collected 18 @ 22:40)  Source: .Blood Blood  Preliminary Report (10-01-18 @ 07:01):    No growth to date.    Culture - Urine (collected 18 @ 22:30)  Source: .Urine Clean Catch (Midstream)  Final Report (10-01-18 @ 11:16):    Culture grew 3 or more types of organisms which indicate    collection contamination; consider recollection only if clinically    indicated.      Urinalysis Basic - ( 29 Sep 2018 22:30 )    Color: Yellow / Appearance: Cloudy / S.025 / pH: x  Gluc: x / Ketone: Negative  / Bili: Negative / Urobili: 1.0 mg/dL   Blood: x / Protein: 30 mg/dL / Nitrite: Negative   Leuk Esterase: Negative / RBC: 10-25 /HPF / WBC x   Sq Epi: x / Non Sq Epi: x / Bacteria: Few /HPF          RADIOLOGY & ADDITIONAL TESTS:    ANTIBIOTICS:

## 2018-10-01 NOTE — DISCHARGE NOTE ADULT - CARE PROVIDERS DIRECT ADDRESSES
[email or web address]

## 2018-10-02 LAB
HETEROPH AB TITR SER AGGL: NEGATIVE — SIGNIFICANT CHANGE UP
RHEUMATOID FACT SERPL-ACNC: 11 IU/ML — SIGNIFICANT CHANGE UP (ref 0–13)

## 2018-10-03 ENCOUNTER — TRANSCRIPTION ENCOUNTER (OUTPATIENT)
Age: 43
End: 2018-10-03

## 2018-10-03 LAB
BRUCELLA IGG SER QL IA: NEGATIVE — SIGNIFICANT CHANGE UP
BRUCELLA IGG+IGM SER-IMP: SIGNIFICANT CHANGE UP
BRUCELLA IGM SER QL IA: NEGATIVE — SIGNIFICANT CHANGE UP

## 2018-10-05 LAB
ANA TITR SER: NEGATIVE — SIGNIFICANT CHANGE UP
CULTURE RESULTS: SIGNIFICANT CHANGE UP
CULTURE RESULTS: SIGNIFICANT CHANGE UP
SPECIMEN SOURCE: SIGNIFICANT CHANGE UP
SPECIMEN SOURCE: SIGNIFICANT CHANGE UP

## 2018-10-09 DIAGNOSIS — B34.9 VIRAL INFECTION, UNSPECIFIED: ICD-10-CM

## 2018-10-09 DIAGNOSIS — D50.9 IRON DEFICIENCY ANEMIA, UNSPECIFIED: ICD-10-CM

## 2018-10-09 DIAGNOSIS — D25.9 LEIOMYOMA OF UTERUS, UNSPECIFIED: ICD-10-CM

## 2018-10-09 DIAGNOSIS — R51 HEADACHE: ICD-10-CM

## 2018-10-09 DIAGNOSIS — D72.819 DECREASED WHITE BLOOD CELL COUNT, UNSPECIFIED: ICD-10-CM

## 2018-10-09 DIAGNOSIS — M50.20 OTHER CERVICAL DISC DISPLACEMENT, UNSPECIFIED CERVICAL REGION: ICD-10-CM

## 2018-10-09 DIAGNOSIS — D72.825 BANDEMIA: ICD-10-CM

## 2018-10-09 DIAGNOSIS — R11.0 NAUSEA: ICD-10-CM

## 2018-10-09 DIAGNOSIS — M54.5 LOW BACK PAIN: ICD-10-CM

## 2018-10-09 DIAGNOSIS — R50.9 FEVER, UNSPECIFIED: ICD-10-CM

## 2018-10-26 ENCOUNTER — EMERGENCY (EMERGENCY)
Facility: HOSPITAL | Age: 43
LOS: 1 days | Discharge: HOME | End: 2018-10-26
Attending: EMERGENCY MEDICINE | Admitting: EMERGENCY MEDICINE
Payer: MEDICAID

## 2018-10-26 VITALS
SYSTOLIC BLOOD PRESSURE: 116 MMHG | HEART RATE: 83 BPM | TEMPERATURE: 97 F | DIASTOLIC BLOOD PRESSURE: 76 MMHG | OXYGEN SATURATION: 99 % | RESPIRATION RATE: 16 BRPM

## 2018-10-26 DIAGNOSIS — Z98.890 OTHER SPECIFIED POSTPROCEDURAL STATES: Chronic | ICD-10-CM

## 2018-10-26 LAB
ANION GAP SERPL CALC-SCNC: 8 MMOL/L — LOW (ref 9–16)
BASOPHILS NFR BLD AUTO: 0.2 % — SIGNIFICANT CHANGE UP (ref 0–2)
BUN SERPL-MCNC: 6 MG/DL — LOW (ref 7–23)
CALCIUM SERPL-MCNC: 9.4 MG/DL — SIGNIFICANT CHANGE UP (ref 8.5–10.5)
CHLORIDE SERPL-SCNC: 104 MMOL/L — SIGNIFICANT CHANGE UP (ref 96–108)
CO2 SERPL-SCNC: 28 MMOL/L — SIGNIFICANT CHANGE UP (ref 22–31)
CREAT SERPL-MCNC: 0.79 MG/DL — SIGNIFICANT CHANGE UP (ref 0.5–1.3)
EOSINOPHIL NFR BLD AUTO: 1.2 % — SIGNIFICANT CHANGE UP (ref 0–6)
GLUCOSE SERPL-MCNC: 81 MG/DL — SIGNIFICANT CHANGE UP (ref 70–99)
HCG UR QL: NEGATIVE — SIGNIFICANT CHANGE UP
HCT VFR BLD CALC: 37.4 % — SIGNIFICANT CHANGE UP (ref 34.5–45)
HGB BLD-MCNC: 12.2 G/DL — SIGNIFICANT CHANGE UP (ref 11.5–15.5)
IMM GRANULOCYTES NFR BLD AUTO: 0.2 % — SIGNIFICANT CHANGE UP (ref 0–1.5)
LYMPHOCYTES # BLD AUTO: 46.4 % — HIGH (ref 13–44)
MAGNESIUM SERPL-MCNC: 2.3 MG/DL — SIGNIFICANT CHANGE UP (ref 1.6–2.6)
MCHC RBC-ENTMCNC: 26.2 PG — LOW (ref 27–34)
MCHC RBC-ENTMCNC: 32.6 G/DL — SIGNIFICANT CHANGE UP (ref 32–36)
MCV RBC AUTO: 80.3 FL — SIGNIFICANT CHANGE UP (ref 80–100)
MONOCYTES NFR BLD AUTO: 5.3 % — SIGNIFICANT CHANGE UP (ref 2–14)
NEUTROPHILS NFR BLD AUTO: 46.7 % — SIGNIFICANT CHANGE UP (ref 43–77)
PLATELET # BLD AUTO: 240 K/UL — SIGNIFICANT CHANGE UP (ref 150–400)
POTASSIUM SERPL-MCNC: 3.6 MMOL/L — SIGNIFICANT CHANGE UP (ref 3.5–5.3)
POTASSIUM SERPL-SCNC: 3.6 MMOL/L — SIGNIFICANT CHANGE UP (ref 3.5–5.3)
RBC # BLD: 4.66 M/UL — SIGNIFICANT CHANGE UP (ref 3.8–5.2)
RBC # FLD: 19.6 % — HIGH (ref 10.3–14.5)
S PYO AG SPEC QL IA: NEGATIVE — SIGNIFICANT CHANGE UP
SODIUM SERPL-SCNC: 140 MMOL/L — SIGNIFICANT CHANGE UP (ref 132–145)
TSH SERPL-MCNC: 0.55 UIU/ML — SIGNIFICANT CHANGE UP (ref 0.36–3.74)
WBC # BLD: 8.2 K/UL — SIGNIFICANT CHANGE UP (ref 3.8–10.5)
WBC # FLD AUTO: 8.2 K/UL — SIGNIFICANT CHANGE UP (ref 3.8–10.5)

## 2018-10-26 PROCEDURE — 99284 EMERGENCY DEPT VISIT MOD MDM: CPT

## 2018-10-26 RX ORDER — SODIUM CHLORIDE 9 MG/ML
1000 INJECTION INTRAMUSCULAR; INTRAVENOUS; SUBCUTANEOUS ONCE
Qty: 0 | Refills: 0 | Status: COMPLETED | OUTPATIENT
Start: 2018-10-26 | End: 2018-10-26

## 2018-10-26 RX ADMIN — SODIUM CHLORIDE 1000 MILLILITER(S): 9 INJECTION INTRAMUSCULAR; INTRAVENOUS; SUBCUTANEOUS at 22:37

## 2018-10-26 NOTE — ED ADULT NURSE NOTE - OBJECTIVE STATEMENT
pt aox4 c/o generalized weakness, pt otherwise neurologically wnl. no sob or difficulty breathing noted. lung sounds clear bilaterally. skin appropriate for ethnicity, warm and dry. cap refill < 2 secs. pulses 2+ and regular. abd rounded soft and nontender. pt c/o throat pain and difficulty swallowing. pt able to speak with clear speech and tolerate own secretions. pt also states that she has intermittent fever and chills. pt recently diagnosed with fungal infection and nodules of the thyroid.

## 2018-10-26 NOTE — ED PROVIDER NOTE - MEDICAL DECISION MAKING DETAILS
patient amenable to an iv, requesting an iv and saline to try and help all her symptoms. advised follow up with her neuro appt this week at which time she has a scheduled work up with labs and MRI. patient amenable to basic labs in ed. well appearing with stable vs, and normal neuro exam. deferred ct . no suspicion for bleed or cva.

## 2018-10-26 NOTE — ED PROVIDER NOTE - OBJECTIVE STATEMENT
Patient with pmhx of hypothyroid, no current medications started, hx of chronic allergic rhinitis, deviated septum, currenlty following with ent, and pmd in [facility], hx of new onset dizziness, with referral to neurology, s/p admission to [facility] for one week in the beginning of the month, at which time she notes she had an extensive work up for multiple symptoms, and suspected bacteremia. patient arrives with her dc paperwork and clinical summary from [facility], and a prescription to see neurology for IGA levels, MRI, and workup. patient requesting for this to be done today in the ED because she can't wait for her appt on tuesday. patient denies several onset headache, visual changes, denies neck pain, cp, sob, abd pain, N/V/D. denies fever or chills. patient notes she feels as if something is coursing through her veins, and the symptoms of throat discomfort, fatigue, intermittent dizziness, the knowledge of thyroid nodules, and new symptoms are culminating and taking over. patient denies hx of psych. denies family hx or sick contacts. denies etoh, drugs or smoking.

## 2018-10-27 VITALS
RESPIRATION RATE: 16 BRPM | OXYGEN SATURATION: 99 % | SYSTOLIC BLOOD PRESSURE: 110 MMHG | DIASTOLIC BLOOD PRESSURE: 75 MMHG

## 2018-10-27 PROBLEM — D25.9 LEIOMYOMA OF UTERUS, UNSPECIFIED: Chronic | Status: ACTIVE | Noted: 2018-09-30

## 2018-10-29 LAB
CULTURE RESULTS: SIGNIFICANT CHANGE UP
SPECIMEN SOURCE: SIGNIFICANT CHANGE UP

## 2018-10-30 DIAGNOSIS — E03.9 HYPOTHYROIDISM, UNSPECIFIED: ICD-10-CM

## 2018-10-30 DIAGNOSIS — R53.1 WEAKNESS: ICD-10-CM

## 2018-10-30 DIAGNOSIS — R49.0 DYSPHONIA: ICD-10-CM

## 2018-10-30 DIAGNOSIS — R07.0 PAIN IN THROAT: ICD-10-CM

## 2018-10-30 DIAGNOSIS — R42 DIZZINESS AND GIDDINESS: ICD-10-CM

## 2018-10-30 DIAGNOSIS — Z88.0 ALLERGY STATUS TO PENICILLIN: ICD-10-CM

## 2019-01-04 ENCOUNTER — OUTPATIENT (OUTPATIENT)
Dept: OUTPATIENT SERVICES | Facility: HOSPITAL | Age: 44
LOS: 1 days | Discharge: HOME | End: 2019-01-04

## 2019-01-04 DIAGNOSIS — E01.2 IODINE-DEFICIENCY RELATED (ENDEMIC) GOITER, UNSPECIFIED: ICD-10-CM

## 2019-01-04 DIAGNOSIS — Z98.890 OTHER SPECIFIED POSTPROCEDURAL STATES: Chronic | ICD-10-CM

## 2019-04-11 ENCOUNTER — OUTPATIENT (OUTPATIENT)
Dept: OUTPATIENT SERVICES | Facility: HOSPITAL | Age: 44
LOS: 1 days | Discharge: HOME | End: 2019-04-11

## 2019-04-11 DIAGNOSIS — D47.1 CHRONIC MYELOPROLIFERATIVE DISEASE: ICD-10-CM

## 2019-04-11 DIAGNOSIS — R97.0 ELEVATED CARCINOEMBRYONIC ANTIGEN [CEA]: ICD-10-CM

## 2019-04-11 DIAGNOSIS — Z98.890 OTHER SPECIFIED POSTPROCEDURAL STATES: Chronic | ICD-10-CM

## 2019-04-11 DIAGNOSIS — C64.9 MALIGNANT NEOPLASM OF UNSPECIFIED KIDNEY, EXCEPT RENAL PELVIS: ICD-10-CM

## 2019-05-16 ENCOUNTER — OUTPATIENT (OUTPATIENT)
Dept: OUTPATIENT SERVICES | Facility: HOSPITAL | Age: 44
LOS: 1 days | Discharge: HOME | End: 2019-05-16

## 2019-05-16 DIAGNOSIS — Z98.890 OTHER SPECIFIED POSTPROCEDURAL STATES: Chronic | ICD-10-CM

## 2019-05-16 DIAGNOSIS — E04.2 NONTOXIC MULTINODULAR GOITER: ICD-10-CM

## 2021-07-08 NOTE — ED ADULT TRIAGE NOTE - TEMPERATURE IN CELSIUS (DEGREES C)
PAST MEDICAL HISTORY:  Anxiety     Bipolar disorder     GERD (gastroesophageal reflux disease)     HTN (hypertension)     Seizure-like activity     Stroke     Substance abuse opioid use     38.8

## 2022-08-01 NOTE — ED PROVIDER NOTE - NS ED MD DISPO DISCHARGE CCDA
[facility]  EMERGENCY DEPARTMENT ENCOUNTER          PHYSICIAN ASSISTANT NOTE       Date of evaluation: 7/31/2022    Chief Complaint     Allergic Reaction      History of Present Illness     HPI: Angela Graves is a 62 y.o. male with history of hyperlipidemia, hypertension, kidney transplant, asthma who presents to the emergency department with concern for allergic reaction and asthma exacerbation. Patient ate a nectarine, approximately 30 to 45 minutes later developed itching all over his body. He then felt that he was having an asthma attack. He did not take any of his asthma medications on his way here, tells me that he has no history of allergic reactions and does not have an EpiPen at home. He denies any chest pain. He feels that he has a rash primarily on his trunk and his arms that is itchy. He has never had a similar rash in the past.  He denies any swelling in his throat but does feel that he is having difficulty breathing. With the exception of the above, there are no aggravating or alleviating factors. Review of Systems     Review of Systems   Unable to perform ROS: Severe respiratory distress     Past Medical, Surgical, Family, and Social History     He has a past medical history of Dyspepsia and other specified disorders of function of stomach, Goiter, unspecified, HLD (hyperlipidemia), HTN (hypertension), Hypocalcemia, Kidney replaced by transplant, Other abnormal glucose, Screening PSA (prostate specific antigen), Unspecified asthma(493.90), and Unspecified hypothyroidism. He has a past surgical history that includes quadricepsplasty; Kidney transplant (2000); Colonoscopy; hernia repair; and Abdomen surgery (Left, 10/02/2015). His family history is not on file. He reports that he quit smoking about 2 years ago. His smoking use included cigarettes. He has a 5.00 pack-year smoking history. He has never used smokeless tobacco. He reports current alcohol use.  He reports that Visibly tachypneic, only able to speak in one-word sentences. Diminished air movement throughout all lung fields. Abdominal:      General: Abdomen is flat. There is no distension. Palpations: Abdomen is soft. Tenderness: There is no abdominal tenderness. There is no guarding or rebound. Musculoskeletal:         General: Normal range of motion. Cervical back: Normal range of motion. Right lower leg: No edema. Left lower leg: No edema. Skin:     Comments: Generalized urticaria across patient's trunk and upper extremities. Neurological:      General: No focal deficit present. Mental Status: He is alert. Mental status is at baseline. Psychiatric:         Mood and Affect: Mood normal.         Behavior: Behavior normal.     Diagnostic Results     RADIOLOGY:  No orders to display     LABS:   No results found for this visit on 07/31/22. RECENT VITALS:  BP: 132/68, Temp: 97.8 °F (36.6 °C), Heart Rate: 85, Resp: 17, SpO2: 97 %     Procedures     N/a    ED Course     Nursing Notes, Past Medical Hx,Past Surgical Hx, Social Hx, Allergies, and Family Hx were reviewed.     The patient was given the following medications:  Orders Placed This Encounter   Medications    diphenhydrAMINE (BENADRYL) injection 25 mg    famotidine (PEPCID) 20 mg in sodium chloride (PF) 10 mL injection    EPINEPHrine (EPIPEN) 0.3 MG/0.3ML injection 0.3 mg    methylPREDNISolone sodium (SOLU-MEDROL) injection 125 mg    ipratropium-albuterol (DUONEB) nebulizer solution 1 ampule     Order Specific Question:   Initiate RT Bronchodilator Protocol     Answer:   Yes - Inpatient Protocol    lactated ringers bolus    ondansetron (ZOFRAN) injection 4 mg    ondansetron (ZOFRAN) 4 MG/2ML injection     Danuta Huitron: cabinet override       CONSULTS:  None    MEDICAL Josue Moustapha / ASSESSMENT / PLAN     Vitals:    07/31/22 1959 07/31/22 2022 07/31/22 2030 07/31/22 2125   BP: (!) 135/100  132/68    Pulse: (!) 112  (!) 104 85 Resp: 18  17    Temp:  97.8 °F (36.6 °C)     TempSrc:  Oral     SpO2: 90%  97%        Ann Warner is a 62 y.o. male who presents to the emergency department with allergic reaction and asthma exacerbation. Patient ate a nectarine and approximately 30 to 45 minutes later developed diffuse itching across his body as well as acute onset shortness of breath that feels similar to his previous asthma exacerbations. Patient upon arrival was tachycardic and had a diminishing oxygen saturation at which time he was placed on nasal cannula. Given concern for allergic reaction, patient was administered an EpiPen, Benadryl, Pepcid as well as Solu-Medrol. Patient was also given breathing treatments by the respiratory therapist.  On reassessment patient notes that he is feeling significantly better. At that time I did take him off of oxygen, offered him another breathing treatment but he declined. On repeat lung auscultation patient had significant improvement in airway movement. At this time given patient's significant improvement of exam he will be observed for another 2 hours, 4 hours since epinephrine administration to ensure that he has no worsening of his respiratory status. Please see oncoming provider's note, Dr. Brant Cabrera, for additional medical decision-making, interventions and ultimate disposition for this patient. The patient was evaluated by myself and the ED Attending Physician, Dr. Jacquelyn Kan. All management and disposition plans were discussed and agreed upon. Clinical Impression     1. Allergic reaction, initial encounter      This note was dictated using voice-recognition software, which occasionally leads to inadvertent typographic errors. Disposition     DISPOSITION  - Pending.        Daria Guadarrama [facility]  07/31/22 6170 Patient/Caregiver provided printed discharge information.

## 2024-07-11 ENCOUNTER — EMERGENCY (EMERGENCY)
Facility: HOSPITAL | Age: 49
LOS: 1 days | Discharge: HOME | End: 2024-07-11
Attending: EMERGENCY MEDICINE | Admitting: EMERGENCY MEDICINE
Payer: MEDICAID

## 2024-07-11 VITALS
SYSTOLIC BLOOD PRESSURE: 113 MMHG | HEART RATE: 104 BPM | OXYGEN SATURATION: 100 % | HEIGHT: 65 IN | TEMPERATURE: 98 F | WEIGHT: 145.06 LBS | DIASTOLIC BLOOD PRESSURE: 76 MMHG | RESPIRATION RATE: 20 BRPM

## 2024-07-11 VITALS
HEART RATE: 77 BPM | RESPIRATION RATE: 20 BRPM | SYSTOLIC BLOOD PRESSURE: 107 MMHG | DIASTOLIC BLOOD PRESSURE: 70 MMHG | TEMPERATURE: 98 F | OXYGEN SATURATION: 100 %

## 2024-07-11 DIAGNOSIS — R06.02 SHORTNESS OF BREATH: ICD-10-CM

## 2024-07-11 DIAGNOSIS — R07.89 OTHER CHEST PAIN: ICD-10-CM

## 2024-07-11 DIAGNOSIS — F41.9 ANXIETY DISORDER, UNSPECIFIED: ICD-10-CM

## 2024-07-11 DIAGNOSIS — R20.0 ANESTHESIA OF SKIN: ICD-10-CM

## 2024-07-11 DIAGNOSIS — Z88.0 ALLERGY STATUS TO PENICILLIN: ICD-10-CM

## 2024-07-11 DIAGNOSIS — Z56.3 STRESSFUL WORK SCHEDULE: ICD-10-CM

## 2024-07-11 DIAGNOSIS — Z98.890 OTHER SPECIFIED POSTPROCEDURAL STATES: Chronic | ICD-10-CM

## 2024-07-11 DIAGNOSIS — R00.2 PALPITATIONS: ICD-10-CM

## 2024-07-11 LAB
ANION GAP SERPL CALC-SCNC: 12 MMOL/L — SIGNIFICANT CHANGE UP (ref 5–17)
BASOPHILS # BLD AUTO: 0.03 K/UL — SIGNIFICANT CHANGE UP (ref 0–0.2)
BASOPHILS NFR BLD AUTO: 0.3 % — SIGNIFICANT CHANGE UP (ref 0–2)
BUN SERPL-MCNC: 16 MG/DL — SIGNIFICANT CHANGE UP (ref 7–23)
CALCIUM SERPL-MCNC: 9.4 MG/DL — SIGNIFICANT CHANGE UP (ref 8.4–10.5)
CHLORIDE SERPL-SCNC: 104 MMOL/L — SIGNIFICANT CHANGE UP (ref 96–108)
CO2 SERPL-SCNC: 23 MMOL/L — SIGNIFICANT CHANGE UP (ref 22–31)
CREAT SERPL-MCNC: 0.69 MG/DL — SIGNIFICANT CHANGE UP (ref 0.5–1.3)
EGFR: 107 ML/MIN/1.73M2 — SIGNIFICANT CHANGE UP
EOSINOPHIL # BLD AUTO: 0.04 K/UL — SIGNIFICANT CHANGE UP (ref 0–0.5)
EOSINOPHIL NFR BLD AUTO: 0.4 % — SIGNIFICANT CHANGE UP (ref 0–6)
GLUCOSE SERPL-MCNC: 136 MG/DL — HIGH (ref 70–99)
HCT VFR BLD CALC: 35.2 % — SIGNIFICANT CHANGE UP (ref 34.5–45)
HGB BLD-MCNC: 11.9 G/DL — SIGNIFICANT CHANGE UP (ref 11.5–15.5)
IMM GRANULOCYTES NFR BLD AUTO: 0.2 % — SIGNIFICANT CHANGE UP (ref 0–0.9)
LYMPHOCYTES # BLD AUTO: 2.41 K/UL — SIGNIFICANT CHANGE UP (ref 1–3.3)
LYMPHOCYTES # BLD AUTO: 26.2 % — SIGNIFICANT CHANGE UP (ref 13–44)
MCHC RBC-ENTMCNC: 27.9 PG — SIGNIFICANT CHANGE UP (ref 27–34)
MCHC RBC-ENTMCNC: 33.8 GM/DL — SIGNIFICANT CHANGE UP (ref 32–36)
MCV RBC AUTO: 82.4 FL — SIGNIFICANT CHANGE UP (ref 80–100)
MONOCYTES # BLD AUTO: 0.5 K/UL — SIGNIFICANT CHANGE UP (ref 0–0.9)
MONOCYTES NFR BLD AUTO: 5.4 % — SIGNIFICANT CHANGE UP (ref 2–14)
NEUTROPHILS # BLD AUTO: 6.19 K/UL — SIGNIFICANT CHANGE UP (ref 1.8–7.4)
NEUTROPHILS NFR BLD AUTO: 67.5 % — SIGNIFICANT CHANGE UP (ref 43–77)
NRBC # BLD: 0 /100 WBCS — SIGNIFICANT CHANGE UP (ref 0–0)
PLATELET # BLD AUTO: 319 K/UL — SIGNIFICANT CHANGE UP (ref 150–400)
POTASSIUM SERPL-MCNC: 3.4 MMOL/L — LOW (ref 3.5–5.3)
POTASSIUM SERPL-SCNC: 3.4 MMOL/L — LOW (ref 3.5–5.3)
RBC # BLD: 4.27 M/UL — SIGNIFICANT CHANGE UP (ref 3.8–5.2)
RBC # FLD: 12.6 % — SIGNIFICANT CHANGE UP (ref 10.3–14.5)
SODIUM SERPL-SCNC: 139 MMOL/L — SIGNIFICANT CHANGE UP (ref 135–145)
TROPONIN T, HIGH SENSITIVITY RESULT: 12 NG/L — SIGNIFICANT CHANGE UP (ref 0–51)
TROPONIN T, HIGH SENSITIVITY RESULT: 9 NG/L — SIGNIFICANT CHANGE UP (ref 0–51)
WBC # BLD: 9.19 K/UL — SIGNIFICANT CHANGE UP (ref 3.8–10.5)
WBC # FLD AUTO: 9.19 K/UL — SIGNIFICANT CHANGE UP (ref 3.8–10.5)

## 2024-07-11 PROCEDURE — 93010 ELECTROCARDIOGRAM REPORT: CPT

## 2024-07-11 PROCEDURE — 99284 EMERGENCY DEPT VISIT MOD MDM: CPT

## 2024-07-11 PROCEDURE — 84484 ASSAY OF TROPONIN QUANT: CPT

## 2024-07-11 PROCEDURE — 36415 COLL VENOUS BLD VENIPUNCTURE: CPT

## 2024-07-11 PROCEDURE — 93005 ELECTROCARDIOGRAM TRACING: CPT

## 2024-07-11 PROCEDURE — 85025 COMPLETE CBC W/AUTO DIFF WBC: CPT

## 2024-07-11 PROCEDURE — 99283 EMERGENCY DEPT VISIT LOW MDM: CPT | Mod: 25

## 2024-07-11 PROCEDURE — 80048 BASIC METABOLIC PNL TOTAL CA: CPT

## 2024-07-11 RX ORDER — HYDROXYZINE PAMOATE 50 MG/1
25 CAPSULE ORAL ONCE
Refills: 0 | Status: COMPLETED | OUTPATIENT
Start: 2024-07-11 | End: 2024-07-11

## 2024-07-11 RX ADMIN — HYDROXYZINE PAMOATE 25 MILLIGRAM(S): 50 CAPSULE ORAL at 17:12

## 2024-07-11 SDOH — HEALTH STABILITY - MENTAL HEALTH: STRESSFUL WORK SCHEDULE: Z56.3

## 2024-09-26 PROBLEM — K29.70 GASTRITIS, UNSPECIFIED, WITHOUT BLEEDING: Chronic | Status: ACTIVE | Noted: 2024-07-11

## 2024-10-03 ENCOUNTER — APPOINTMENT (OUTPATIENT)
Dept: INTERVENTIONAL RADIOLOGY/VASCULAR | Facility: CLINIC | Age: 49
End: 2024-10-03
Payer: MEDICAID

## 2024-10-03 VITALS
HEART RATE: 75 BPM | TEMPERATURE: 98.1 F | OXYGEN SATURATION: 97 % | SYSTOLIC BLOOD PRESSURE: 108 MMHG | DIASTOLIC BLOOD PRESSURE: 71 MMHG

## 2024-10-03 PROCEDURE — 99203 OFFICE O/P NEW LOW 30 MIN: CPT

## 2024-10-03 NOTE — ASSESSMENT
[FreeTextEntry1] : 49-year-old female with history of dysmenorrhea with irregular menstrual cycle for evaluation and discussion regarding uterine artery embolization.  She underwent a uterine artery embolization at an outside institution in 2017 in which the patient states they were only able to treat the left side.  Her symptoms slightly improved with no reoccurrence.  Patient is interested in undergoing repeat uterine artery embolization.  I explained that she will require a Pap smear prior to her intervention.  She will also need an MRI with IV contrast of the abdomen and pelvis.  Will schedule a follow-up visit to review the results.

## 2024-10-03 NOTE — END OF VISIT
[Time Spent: ___ minutes] : I have spent [unfilled] minutes of time on the encounter which excludes teaching and separately reported services.
None Known

## 2024-10-03 NOTE — HISTORY OF PRESENT ILLNESS
[FreeTextEntry1] : 49-year-old female with history of fibroid uterus presents with complaints of menorrhagia, irregular menstrual cycle which has been ongoing since 2017.  She underwent a uterine artery embolization in 2017 and was to treat the left uterine artery.  She states that she had a partial response and her symptoms but over the last few months her irregular bleeding has worsened.  She states that she uses approximately 10 pads per day and her periods may last from 8 days to 30 days.  She is experiencing nocturnal urinary frequency and constipation.  She is a [employer] and has not required blood transfusions in the past.  The patient has been using over-the-counter herbal medication for her anemia.  The patient has not undergone a Pap smear since approximately 7 years ago. [Dysmenorrhea] : dysmenorrhea [Menorrhagia] : ~T menorrhagia [Pelvic Pain] : pelvic pain [Urinary Frequency] : urinary frequency [Pressure] : pressure [UAE] : uterine artery embolization [Monthly Cycles Regular] : monthly cycles are not regular [Bleeding In Between Periods] : bleeding in between periods [Menopausal Symptoms] : no complaints of menopausal symptoms [de-identified] : Ultrasound of the pelvis demonstrates a intramural fibroid with subserosal extension into the uterine body.

## 2024-10-03 NOTE — PHYSICAL EXAM
[Alert] : alert [No Acute Distress] : no acute distress [No Respiratory Distress] : no respiratory distress [Normal Rate and Effort] : normal respiratory rhythm and effort [Oriented x3] : oriented to person, place, and time [Fully active, able to carry on all pre-disease performance without restriction] : Fully active, able to carry on all pre-disease performance without restriction

## 2024-10-07 DIAGNOSIS — D21.9 BENIGN NEOPLASM OF CONNECTIVE AND OTHER SOFT TISSUE, UNSPECIFIED: ICD-10-CM

## 2024-10-15 ENCOUNTER — RESULT REVIEW (OUTPATIENT)
Age: 49
End: 2024-10-15

## 2024-10-15 ENCOUNTER — OUTPATIENT (OUTPATIENT)
Dept: OUTPATIENT SERVICES | Facility: HOSPITAL | Age: 49
LOS: 1 days | End: 2024-10-15
Payer: MEDICAID

## 2024-10-15 DIAGNOSIS — Z98.890 OTHER SPECIFIED POSTPROCEDURAL STATES: Chronic | ICD-10-CM

## 2024-10-15 DIAGNOSIS — D21.9 BENIGN NEOPLASM OF CONNECTIVE AND OTHER SOFT TISSUE, UNSPECIFIED: ICD-10-CM

## 2024-10-15 PROCEDURE — 72197 MRI PELVIS W/O & W/DYE: CPT

## 2024-10-15 PROCEDURE — 72197 MRI PELVIS W/O & W/DYE: CPT | Mod: 26

## 2024-10-15 PROCEDURE — A9579: CPT

## 2024-10-16 DIAGNOSIS — D21.9 BENIGN NEOPLASM OF CONNECTIVE AND OTHER SOFT TISSUE, UNSPECIFIED: ICD-10-CM

## 2024-10-24 ENCOUNTER — TRANSCRIPTION ENCOUNTER (OUTPATIENT)
Age: 49
End: 2024-10-24

## 2024-10-24 ENCOUNTER — APPOINTMENT (OUTPATIENT)
Dept: INTERVENTIONAL RADIOLOGY/VASCULAR | Facility: CLINIC | Age: 49
End: 2024-10-24

## 2024-10-24 PROCEDURE — XXXXX: CPT | Mod: 1L

## 2024-11-01 ENCOUNTER — OUTPATIENT (OUTPATIENT)
Dept: OUTPATIENT SERVICES | Facility: HOSPITAL | Age: 49
LOS: 1 days | End: 2024-11-01
Payer: MEDICAID

## 2024-11-01 VITALS
RESPIRATION RATE: 16 BRPM | OXYGEN SATURATION: 99 % | DIASTOLIC BLOOD PRESSURE: 75 MMHG | TEMPERATURE: 98 F | HEART RATE: 68 BPM | WEIGHT: 134.92 LBS | HEIGHT: 62 IN | SYSTOLIC BLOOD PRESSURE: 120 MMHG

## 2024-11-01 DIAGNOSIS — D21.9 BENIGN NEOPLASM OF CONNECTIVE AND OTHER SOFT TISSUE, UNSPECIFIED: ICD-10-CM

## 2024-11-01 DIAGNOSIS — Z98.890 OTHER SPECIFIED POSTPROCEDURAL STATES: Chronic | ICD-10-CM

## 2024-11-01 DIAGNOSIS — Z01.818 ENCOUNTER FOR OTHER PREPROCEDURAL EXAMINATION: ICD-10-CM

## 2024-11-01 LAB
ALBUMIN SERPL ELPH-MCNC: 4.9 G/DL — SIGNIFICANT CHANGE UP (ref 3.5–5.2)
ALP SERPL-CCNC: 89 U/L — SIGNIFICANT CHANGE UP (ref 30–115)
ALT FLD-CCNC: 16 U/L — SIGNIFICANT CHANGE UP (ref 0–41)
ANION GAP SERPL CALC-SCNC: 10 MMOL/L — SIGNIFICANT CHANGE UP (ref 7–14)
APPEARANCE UR: CLEAR — SIGNIFICANT CHANGE UP
APTT BLD: 28.9 SEC — SIGNIFICANT CHANGE UP (ref 27–39.2)
AST SERPL-CCNC: 19 U/L — SIGNIFICANT CHANGE UP (ref 0–41)
BASOPHILS # BLD AUTO: 0.03 K/UL — SIGNIFICANT CHANGE UP (ref 0–0.2)
BASOPHILS NFR BLD AUTO: 0.4 % — SIGNIFICANT CHANGE UP (ref 0–1)
BILIRUB SERPL-MCNC: 0.7 MG/DL — SIGNIFICANT CHANGE UP (ref 0.2–1.2)
BILIRUB UR-MCNC: NEGATIVE — SIGNIFICANT CHANGE UP
BUN SERPL-MCNC: 10 MG/DL — SIGNIFICANT CHANGE UP (ref 10–20)
CALCIUM SERPL-MCNC: 9.6 MG/DL — SIGNIFICANT CHANGE UP (ref 8.4–10.5)
CHLORIDE SERPL-SCNC: 102 MMOL/L — SIGNIFICANT CHANGE UP (ref 98–110)
CO2 SERPL-SCNC: 26 MMOL/L — SIGNIFICANT CHANGE UP (ref 17–32)
COLOR SPEC: YELLOW — SIGNIFICANT CHANGE UP
CREAT SERPL-MCNC: 0.7 MG/DL — SIGNIFICANT CHANGE UP (ref 0.7–1.5)
DIFF PNL FLD: NEGATIVE — SIGNIFICANT CHANGE UP
EGFR: 107 ML/MIN/1.73M2 — SIGNIFICANT CHANGE UP
EOSINOPHIL # BLD AUTO: 0.06 K/UL — SIGNIFICANT CHANGE UP (ref 0–0.7)
EOSINOPHIL NFR BLD AUTO: 0.9 % — SIGNIFICANT CHANGE UP (ref 0–8)
GLUCOSE SERPL-MCNC: 97 MG/DL — SIGNIFICANT CHANGE UP (ref 70–99)
GLUCOSE UR QL: NEGATIVE MG/DL — SIGNIFICANT CHANGE UP
HCT VFR BLD CALC: 40.6 % — SIGNIFICANT CHANGE UP (ref 37–47)
HGB BLD-MCNC: 13.2 G/DL — SIGNIFICANT CHANGE UP (ref 12–16)
IMM GRANULOCYTES NFR BLD AUTO: 0.1 % — SIGNIFICANT CHANGE UP (ref 0.1–0.3)
INR BLD: 0.91 RATIO — SIGNIFICANT CHANGE UP (ref 0.65–1.3)
KETONES UR-MCNC: NEGATIVE MG/DL — SIGNIFICANT CHANGE UP
LEUKOCYTE ESTERASE UR-ACNC: NEGATIVE — SIGNIFICANT CHANGE UP
LYMPHOCYTES # BLD AUTO: 2.15 K/UL — SIGNIFICANT CHANGE UP (ref 1.2–3.4)
LYMPHOCYTES # BLD AUTO: 32.1 % — SIGNIFICANT CHANGE UP (ref 20.5–51.1)
MCHC RBC-ENTMCNC: 27.5 PG — SIGNIFICANT CHANGE UP (ref 27–31)
MCHC RBC-ENTMCNC: 32.5 G/DL — SIGNIFICANT CHANGE UP (ref 32–37)
MCV RBC AUTO: 84.6 FL — SIGNIFICANT CHANGE UP (ref 81–99)
MONOCYTES # BLD AUTO: 0.38 K/UL — SIGNIFICANT CHANGE UP (ref 0.1–0.6)
MONOCYTES NFR BLD AUTO: 5.7 % — SIGNIFICANT CHANGE UP (ref 1.7–9.3)
NEUTROPHILS # BLD AUTO: 4.06 K/UL — SIGNIFICANT CHANGE UP (ref 1.4–6.5)
NEUTROPHILS NFR BLD AUTO: 60.8 % — SIGNIFICANT CHANGE UP (ref 42.2–75.2)
NITRITE UR-MCNC: NEGATIVE — SIGNIFICANT CHANGE UP
NRBC # BLD: 0 /100 WBCS — SIGNIFICANT CHANGE UP (ref 0–0)
PH UR: 6.5 — SIGNIFICANT CHANGE UP (ref 5–8)
PLATELET # BLD AUTO: 347 K/UL — SIGNIFICANT CHANGE UP (ref 130–400)
PMV BLD: 10.3 FL — SIGNIFICANT CHANGE UP (ref 7.4–10.4)
POTASSIUM SERPL-MCNC: 4.6 MMOL/L — SIGNIFICANT CHANGE UP (ref 3.5–5)
POTASSIUM SERPL-SCNC: 4.6 MMOL/L — SIGNIFICANT CHANGE UP (ref 3.5–5)
PROT SERPL-MCNC: 7.6 G/DL — SIGNIFICANT CHANGE UP (ref 6–8)
PROT UR-MCNC: NEGATIVE MG/DL — SIGNIFICANT CHANGE UP
PROTHROM AB SERPL-ACNC: 10.7 SEC — SIGNIFICANT CHANGE UP (ref 9.95–12.87)
RBC # BLD: 4.8 M/UL — SIGNIFICANT CHANGE UP (ref 4.2–5.4)
RBC # FLD: 14.6 % — HIGH (ref 11.5–14.5)
SODIUM SERPL-SCNC: 138 MMOL/L — SIGNIFICANT CHANGE UP (ref 135–146)
SP GR SPEC: 1.02 — SIGNIFICANT CHANGE UP (ref 1–1.03)
UROBILINOGEN FLD QL: 0.2 MG/DL — SIGNIFICANT CHANGE UP (ref 0.2–1)
WBC # BLD: 6.69 K/UL — SIGNIFICANT CHANGE UP (ref 4.8–10.8)
WBC # FLD AUTO: 6.69 K/UL — SIGNIFICANT CHANGE UP (ref 4.8–10.8)

## 2024-11-01 PROCEDURE — 85610 PROTHROMBIN TIME: CPT

## 2024-11-01 PROCEDURE — 87086 URINE CULTURE/COLONY COUNT: CPT

## 2024-11-01 PROCEDURE — 99214 OFFICE O/P EST MOD 30 MIN: CPT | Mod: 25

## 2024-11-01 PROCEDURE — 80053 COMPREHEN METABOLIC PANEL: CPT

## 2024-11-01 PROCEDURE — 85025 COMPLETE CBC W/AUTO DIFF WBC: CPT

## 2024-11-01 PROCEDURE — 85730 THROMBOPLASTIN TIME PARTIAL: CPT

## 2024-11-01 PROCEDURE — 81003 URINALYSIS AUTO W/O SCOPE: CPT

## 2024-11-01 PROCEDURE — 36415 COLL VENOUS BLD VENIPUNCTURE: CPT

## 2024-11-01 NOTE — H&P PST ADULT - NEUROLOGICAL
Anesthesia Pre Eval Note    Anesthesia ROS/Med Hx        Anesthetic Complication History:  Patient does not have a history of anesthetic complications      Pulmonary Review:  Patient does not have a pulmonary history      Neuro/Psych Review:    Positive for psychiatric history    Cardiovascular Review:  Exercise tolerance: good (>4 METS)  Positive for hypertension  Positive for hyperlipidemia    GI/HEPATIC/RENAL Review:  Patient does not have a GI/hepatic/renalhistory       End/Other Review:  Patient does not have an endo/other history    Additional Results:     ALLERGIES:  No Known Allergies       No results found for: WBC, RBC, HGB, HCT, MCHC, SODIUM, POTASSIUM, CHLORIDE, CO2, GLUCOSE, BUN, CREATININE, GFRESTIMATE, EGFRNONAFRAM, GFRA, GFRNA, CALCIUM, HCG, PLT, PTT, INR   Past Medical History:  No date: Cataract  No date: Depression  No date: Essential (primary) hypertension  No date: High cholesterol  No date: Thyroid condition      Comment:  hypothyroidism    Past Surgical History:  No date: Eye surgery      Comment:  cataract surgery  No date: Tubal ligation      Comment:  patient denies complications with anesthesia       Prior to Admission medications :  Medication simvastatin (ZOCOR) 20 MG tablet, Sig Take 20 mg by mouth nightly., Start Date , End Date , Taking? Yes, Authorizing Provider Outside Provider    Medication levothyroxine 50 MCG tablet, Sig Take 50 mcg by mouth daily., Start Date , End Date , Taking? Yes, Authorizing Provider Outside Provider    Medication NIFEdipine CC (ADALAT CC) 30 MG 24 hr tablet, Sig Take 30 mg by mouth daily., Start Date , End Date , Taking? Yes, Authorizing Provider Outside Provider    Medication PARoxetine (PAXIL) 20 MG tablet, Sig Take 20 mg by mouth every morning., Start Date , End Date , Taking? Yes, Authorizing Provider Outside Provider            Relevant Problems   No relevant active problems       Physical Exam     Airway   Mallampati: II  TM Distance: >3 FB  Neck  ROM: Full  Neck: Able to place in sniff position  TMJ Mobility: Good    Cardiovascular  Cardiovascular exam normal  Cardio Rhythm: Regular  Cardio Rate: Normal    Head Assessment  Head assessment: Normocephalic and Atraumatic    General Assessment  General Assessment: Alert and oriented and No acute distress    Dental Exam  Dental exam normal    Pulmonary Exam  Pulmonary exam normal    Abdominal Exam    Patient Demonstrates:  Obese      Anesthesia Plan    ASA Status: 2    Anesthesia Type: MAC    Induction: Intravenous  Preferred Airway Type: Nasal Cannula      Risks Discussed: Nausea, Vomiting, Allergic Reaction, Hypotension, Aspiration, Dental Injury, ICU Admit, Post-op Intubation, Emergence Delirium, Need for Blood Transfusion, Intra-operative Awareness and Sore Throat    Post-op Pain Management: Per Surgeon      Checklist  Reviewed: NPO Status, Allergies, Beta Blocker Status, Medications, Outside Records, Problem list and Care Everywhere    Informed Consent  The proposed anesthetic plan, including its risks and benefits, have been discussed with the Patient  - along with the risks and benefits of alternatives.  Questions were encouraged and answered and the patient and/or representative understands and agrees to proceed.     Blood Products: Not Anticipated     normal/cranial nerves II-XII intact/sensation intact

## 2024-11-01 NOTE — H&P PST ADULT - NSICDXPASTSURGICALHX_GEN_ALL_CORE_FT
PAST SURGICAL HISTORY:  History of hand surgery left hand, due to trauam    Status post embolization of uterine artery

## 2024-11-01 NOTE — H&P PST ADULT - NSANTHOSAYNRD_GEN_A_CORE
79 No. TANVIR screening performed.  STOP BANG Legend: 0-2 = LOW Risk; 3-4 = INTERMEDIATE Risk; 5-8 = HIGH Risk

## 2024-11-01 NOTE — H&P PST ADULT - NSICDXFAMILYHX_GEN_ALL_CORE_FT
FAMILY HISTORY:  Family history of diabetes mellitus (DM), mom    Father  Still living? No  Family history of bladder cancer, Age at diagnosis: Age Unknown  Family history of diabetes mellitus, Age at diagnosis: Age Unknown    Sibling  Still living? Yes, Estimated age: 31-40  Family history of diabetes mellitus, Age at diagnosis: Age Unknown

## 2024-11-01 NOTE — H&P PST ADULT - HISTORY OF PRESENT ILLNESS
FYI: highly anxious patient; she's worried about catching germs  -[employer]; no blood products; only uses holistic products    Mallampati: 2    FOS: 2-3    CC: pt has a HX of similar procedure a few years ago; MRI showed she has 3 uterine fibroids (5 cm, 3 cm and 2 cm per pt); she has been experiencing frequency; if she sits for a prolonged period she experiences low back pain which causes her to limp; now she feels pressure sensation; LMP was 10/13/24 and it lasts no more than 7 days    Anesthesia Alert  NO--Difficult Airway  NO--History of neck surgery or radiation  NO--Limited ROM of neck  NO--History of Malignant hyperthermia  NO--Personal or family history of Pseudocholinesterase deficiency.  NO--Prior Anesthesia Complication  NO--Latex Allergy  NO--Loose teeth  NO--History of Rheumatoid Arthritis  NO--TANVIR  NO--Bleeding risk  NO--Other_____    Duke Activity Status Index (DASI) from [employer]  on 11/1/2024  ** All calculations should be rechecked by clinician prior to use **    RESULT SUMMARY:  40.45 points  The higher the score (maximum 58.2), the higher the functional status.    7.71 METs        INPUTS:  Take care of self —> 2.75 = Yes  Walk indoors —> 1.75 = Yes  Walk 1&ndash;2 blocks on level ground —> 2.75 = Yes  Climb a flight of stairs or walk up a hill —> 5.5 = Yes  Run a short distance —> 8 = Yes  Do light work around the house —> 2.7 = Yes  Do moderate work around the house —> 3.5 = Yes  Do heavy work around the house —> 0 = No  Do yardwork —> 0 = No  Have sexual relations —> 0 = No  Participate in moderate recreational activities —> 6 = Yes  Participate in strenuous sports —> 7.5 = Yes    Revised Cardiac Risk Index for Pre-Operative Risk from [employer]  on 11/1/2024  ** All calculations should be rechecked by clinician prior to use **    RESULT SUMMARY:  0 points  Class I Risk    3.9 %  30-day risk of death, MI, or cardiac arrest<br><br>From Jocelynn 2017. These numbers are higher than those from the original study (Geovani 1999). See Evidence for details.      INPUTS:  Elevated-risk surgery —> 0 = No  History of ischemic heart disease —> 0 = No  History of congestive heart failure —> 0 = No  History of cerebrovascular disease —> 0 = No  Pre-operative treatment with insulin —> 0 = No  Pre-operative creatinine >2 mg/dL / 176.8 µmol/L —> 0 = No

## 2024-11-01 NOTE — H&P PST ADULT - REASON FOR ADMISSION
Patient is a _49____ year old ___female presenting to [facility] in preparation for _uterine artery embolization_____ on _11/06/2024_____ under __LSB_____ anesthesia by  __Yomi_______ .

## 2024-11-01 NOTE — H&P PST ADULT - NSICDXPASTMEDICALHX_GEN_ALL_CORE_FT
PAST MEDICAL HISTORY:  Gastritis     History of breast problem     Thyroid nodule     Uterine fibroid

## 2024-11-02 DIAGNOSIS — Z01.818 ENCOUNTER FOR OTHER PREPROCEDURAL EXAMINATION: ICD-10-CM

## 2024-11-02 DIAGNOSIS — D21.9 BENIGN NEOPLASM OF CONNECTIVE AND OTHER SOFT TISSUE, UNSPECIFIED: ICD-10-CM

## 2024-11-02 LAB
CULTURE RESULTS: SIGNIFICANT CHANGE UP
SPECIMEN SOURCE: SIGNIFICANT CHANGE UP

## 2024-11-06 ENCOUNTER — TRANSCRIPTION ENCOUNTER (OUTPATIENT)
Age: 49
End: 2024-11-06

## 2024-11-06 ENCOUNTER — OUTPATIENT (OUTPATIENT)
Dept: OUTPATIENT SERVICES | Facility: HOSPITAL | Age: 49
LOS: 1 days | Discharge: HOME | End: 2024-11-06
Payer: MEDICAID

## 2024-11-06 VITALS
HEART RATE: 86 BPM | DIASTOLIC BLOOD PRESSURE: 66 MMHG | RESPIRATION RATE: 18 BRPM | SYSTOLIC BLOOD PRESSURE: 120 MMHG | WEIGHT: 135.14 LBS | OXYGEN SATURATION: 99 % | HEIGHT: 62 IN | TEMPERATURE: 98 F

## 2024-11-06 VITALS
HEART RATE: 77 BPM | SYSTOLIC BLOOD PRESSURE: 102 MMHG | OXYGEN SATURATION: 97 % | DIASTOLIC BLOOD PRESSURE: 61 MMHG | RESPIRATION RATE: 18 BRPM

## 2024-11-06 DIAGNOSIS — Z98.890 OTHER SPECIFIED POSTPROCEDURAL STATES: Chronic | ICD-10-CM

## 2024-11-06 DIAGNOSIS — D21.9 BENIGN NEOPLASM OF CONNECTIVE AND OTHER SOFT TISSUE, UNSPECIFIED: ICD-10-CM

## 2024-11-06 PROCEDURE — C1894: CPT

## 2024-11-06 PROCEDURE — 36247 INS CATH ABD/L-EXT ART 3RD: CPT | Mod: RT

## 2024-11-06 PROCEDURE — 76937 US GUIDE VASCULAR ACCESS: CPT | Mod: 26

## 2024-11-06 PROCEDURE — 37243 VASC EMBOLIZE/OCCLUDE ORGAN: CPT

## 2024-11-06 PROCEDURE — 36246 INS CATH ABD/L-EXT ART 2ND: CPT | Mod: 59,LT

## 2024-11-06 PROCEDURE — C1887: CPT

## 2024-11-06 PROCEDURE — 76937 US GUIDE VASCULAR ACCESS: CPT

## 2024-11-06 PROCEDURE — C1769: CPT

## 2024-11-06 PROCEDURE — C1889: CPT

## 2024-11-06 PROCEDURE — 36247 INS CATH ABD/L-EXT ART 3RD: CPT

## 2024-11-06 PROCEDURE — 36246 INS CATH ABD/L-EXT ART 2ND: CPT | Mod: 59

## 2024-11-06 RX ORDER — BACILLUS COAGULANS/INULIN 1B-250 MG
1 CAPSULE ORAL
Refills: 0 | DISCHARGE

## 2024-11-06 RX ORDER — OXYCODONE AND ACETAMINOPHEN 7.5; 325 MG/1; MG/1
1 TABLET ORAL
Qty: 15 | Refills: 0
Start: 2024-11-06 | End: 2024-11-10

## 2024-11-06 RX ORDER — DOCUSATE SODIUM 100 MG
1 CAPSULE ORAL
Qty: 5 | Refills: 0
Start: 2024-11-06 | End: 2024-11-10

## 2024-11-06 RX ORDER — KETOROLAC TROMETHAMINE 30 MG/ML
1 INJECTION INTRAMUSCULAR; INTRAVENOUS
Qty: 15 | Refills: 0
Start: 2024-11-06 | End: 2024-11-10

## 2024-11-06 RX ORDER — PSYLLIUM HUSK 0.4 G
1 CAPSULE ORAL
Refills: 0 | DISCHARGE

## 2024-11-06 RX ORDER — ASCORBIC ACID 500 MG
1 TABLET ORAL
Refills: 0 | DISCHARGE

## 2024-11-06 RX ORDER — SENNA 187 MG
1 TABLET ORAL
Qty: 5 | Refills: 0
Start: 2024-11-06 | End: 2024-11-10

## 2024-11-06 RX ORDER — ONDANSETRON HYDROCHLORIDE 2 MG/ML
1 INJECTION, SOLUTION INTRAMUSCULAR; INTRAVENOUS
Qty: 10 | Refills: 0
Start: 2024-11-06 | End: 2024-11-10

## 2024-11-06 NOTE — PRE-ANESTHESIA EVALUATION ADULT - NSANTHOSAYNRD_GEN_A_CORE
No. TANVIR screening performed.  STOP BANG Legend: 0-2 = LOW Risk; 3-4 = INTERMEDIATE Risk; 5-8 = HIGH Risk

## 2024-11-06 NOTE — ASU PATIENT PROFILE, ADULT - TEACHING/LEARNING OTHER LEARNERS
I have reviewed discharge instructions with the patient. The patient verbalized understanding. Patient left ED via Discharge Method: ambulatory to Home with self    Opportunity for questions and clarification provided. Patient given 0 scripts. To continue your aftercare when you leave the hospital, you may receive an automated call from our care team to check in on how you are doing. This is a free service and part of our promise to provide the best care and service to meet your aftercare needs.  If you have questions, or wish to unsubscribe from this service please call [phone]. Thank you for Choosing our [facility] Emergency Department.             [address] [place] [address]  05/27/22 0040
family

## 2024-11-06 NOTE — ASU PATIENT PROFILE, ADULT - NS PRO AD PATIENT TYPE
Quality 431: Preventive Care And Screening: Unhealthy Alcohol Use - Screening: Patient not identified as an unhealthy alcohol user when screened for unhealthy alcohol use using a systematic screening method Health Care Proxy (HCP)

## 2024-11-06 NOTE — ASU PATIENT PROFILE, ADULT - FALL HARM RISK - UNIVERSAL INTERVENTIONS
Bed in lowest position, wheels locked, appropriate side rails in place/Call bell, personal items and telephone in reach/Instruct patient to call for assistance before getting out of bed or chair/Non-slip footwear when patient is out of bed/[place] to call system/Physically safe environment - no spills, clutter or unnecessary equipment/Purposeful Proactive Rounding/Room/bathroom lighting operational, light cord in reach

## 2024-11-06 NOTE — CHART NOTE - NSCHARTNOTEFT_GEN_A_CORE
PACU ANESTHESIA ADMISSION NOTE      Procedure: uterine fibroid embolization  Post op diagnosis:  fibroid    ____  Intubated  TV:______       Rate: ______      FiO2: ______    __x__  Patent Airway    __x__  Full return of protective reflexes    __x__  Full recovery from anesthesia / back to baseline status      Mental Status:  __x__ Awake   ___x__ Alert   _____ Drowsy   _____ Sedated    Nausea/Vomiting:  __x__ NO  ______Yes,   See Post - Op Orders          Pain Scale (0-10):  __0___    Treatment: ____ None    __x__ See Post - Op/PCA Orders    Post - Operative Fluids:   ____ Oral   __x__ See Post - Op Orders    Plan: Discharge:   _x___Home       _____Floor     _____Critical Care    _____  Other:_________________    Comments: Patient had smooth intraoperative event, no anesthesia complication.      PACU Vital signs: HR:      84      BP:    105    /     69     RR:        16     O2 Sat:   99    %     Temp 98.1

## 2024-11-06 NOTE — PRE PROCEDURE NOTE - PRE PROCEDURE EVALUATION
49-year-old female with history of dysmenorrhea with irregular menstrual cycle s/p uterine artery embolization at an outside institution in 2017 (left side), symptoms slightly improved with no reoccurrence s/p IR clinic visit on 10/3 presents today for uterine fibroid embolization.    Plan for image guided uterine fibroid embolization with conscious sedation / anesthesia today 11/6

## 2024-11-06 NOTE — ASU DISCHARGE PLAN (ADULT/PEDIATRIC) - FINANCIAL ASSISTANCE
[facility] provides services at a reduced cost to those who are determined to be eligible through [facility]’s financial assistance program. Information regarding [facility]’s financial assistance program can be found by going to [email or web address] or by calling [phone].

## 2024-11-08 PROBLEM — Z87.898 PERSONAL HISTORY OF OTHER SPECIFIED CONDITIONS: Chronic | Status: ACTIVE | Noted: 2024-11-01

## 2024-11-08 PROBLEM — E04.1 NONTOXIC SINGLE THYROID NODULE: Chronic | Status: ACTIVE | Noted: 2024-11-01

## 2025-02-13 ENCOUNTER — APPOINTMENT (OUTPATIENT)
Dept: INTERVENTIONAL RADIOLOGY/VASCULAR | Facility: CLINIC | Age: 50
End: 2025-02-13

## 2025-02-13 VITALS
TEMPERATURE: 97.3 F | OXYGEN SATURATION: 99 % | HEART RATE: 73 BPM | DIASTOLIC BLOOD PRESSURE: 75 MMHG | SYSTOLIC BLOOD PRESSURE: 111 MMHG

## 2025-03-06 ENCOUNTER — TRANSCRIPTION ENCOUNTER (OUTPATIENT)
Age: 50
End: 2025-03-06

## 2025-05-17 ENCOUNTER — OUTPATIENT (OUTPATIENT)
Dept: OUTPATIENT SERVICES | Facility: HOSPITAL | Age: 50
LOS: 1 days | End: 2025-05-17
Payer: MEDICAID

## 2025-05-17 DIAGNOSIS — Z98.890 OTHER SPECIFIED POSTPROCEDURAL STATES: Chronic | ICD-10-CM

## 2025-05-17 DIAGNOSIS — D21.9 BENIGN NEOPLASM OF CONNECTIVE AND OTHER SOFT TISSUE, UNSPECIFIED: ICD-10-CM

## 2025-05-17 PROCEDURE — 76856 US EXAM PELVIC COMPLETE: CPT | Mod: 26

## 2025-05-17 PROCEDURE — 76856 US EXAM PELVIC COMPLETE: CPT

## 2025-05-18 DIAGNOSIS — D21.9 BENIGN NEOPLASM OF CONNECTIVE AND OTHER SOFT TISSUE, UNSPECIFIED: ICD-10-CM

## 2025-06-19 ENCOUNTER — APPOINTMENT (OUTPATIENT)
Dept: INTERVENTIONAL RADIOLOGY/VASCULAR | Facility: CLINIC | Age: 50
End: 2025-06-19

## 2025-06-19 VITALS
TEMPERATURE: 97.9 F | SYSTOLIC BLOOD PRESSURE: 113 MMHG | DIASTOLIC BLOOD PRESSURE: 73 MMHG | HEART RATE: 95 BPM | OXYGEN SATURATION: 99 %